# Patient Record
Sex: FEMALE | Race: WHITE | NOT HISPANIC OR LATINO | Employment: OTHER
[De-identification: names, ages, dates, MRNs, and addresses within clinical notes are randomized per-mention and may not be internally consistent; named-entity substitution may affect disease eponyms.]

---

## 2018-01-20 ENCOUNTER — HEALTH MAINTENANCE LETTER (OUTPATIENT)
Age: 28
End: 2018-01-20

## 2018-02-07 ENCOUNTER — OFFICE VISIT (OUTPATIENT)
Dept: OTOLARYNGOLOGY | Facility: CLINIC | Age: 28
End: 2018-02-07
Payer: COMMERCIAL

## 2018-02-07 VITALS
SYSTOLIC BLOOD PRESSURE: 116 MMHG | WEIGHT: 181 LBS | HEART RATE: 69 BPM | BODY MASS INDEX: 31.56 KG/M2 | DIASTOLIC BLOOD PRESSURE: 80 MMHG | TEMPERATURE: 98.2 F

## 2018-02-07 DIAGNOSIS — H61.23 BILATERAL IMPACTED CERUMEN: Primary | ICD-10-CM

## 2018-02-07 PROCEDURE — 69210 REMOVE IMPACTED EAR WAX UNI: CPT | Performed by: OTOLARYNGOLOGY

## 2018-02-07 PROCEDURE — 99207 ZZC DROP WITH A PROCEDURE: CPT | Performed by: OTOLARYNGOLOGY

## 2018-02-07 ASSESSMENT — PAIN SCALES - GENERAL: PAINLEVEL: NO PAIN (0)

## 2018-02-07 NOTE — MR AVS SNAPSHOT
After Visit Summary   2/7/2018    Ashley Lorenzo    MRN: [id number]           Patient Information     Date Of Birth          1990        Visit Information        Provider Department      2/7/2018 12:45 PM Skye Cosby MD [facility]        Today's Diagnoses     Bilateral impacted cerumen    -  1       Follow-ups after your visit        Who to contact     If you have questions or need follow up information about today's clinic visit or your schedule please contact [facility] directly at [phone].  Normal or non-critical lab and imaging results will be communicated to you by [employer], letter or phone within 4 business days after the clinic has received the results. If you do not hear from us within 7 days, please contact the clinic through [employer] or phone. If you have a critical or abnormal lab result, we will notify you by phone as soon as possible.  Submit refill requests through [employer] or call your pharmacy and they will forward the refill request to us. Please allow 3 business days for your refill to be completed.          Additional Information About Your Visit        MyChart Information     [employer] gives you secure access to your electronic health record. If you see a primary care provider, you can also send messages to your care team and make appointments. If you have questions, please call your primary care clinic.  If you do not have a primary care provider, please call [phone] and they will assist you.        Care EveryWhere ID     This is your Care EveryWhere ID. This could be used by other organizations to access your [place] medical records  [id number]        Your Vitals Were     Pulse Temperature BMI (Body Mass Index)             69 98.2  F (36.8  C) (Oral) 31.56 kg/m2          Blood Pressure from Last 3 Encounters:   02/07/18 116/80   06/24/15 121/70   06/17/15 120/68    Weight from Last 3 Encounters:   02/07/18 82.1 kg (181 lb)   06/17/15 84.8  kg (187 lb)   06/09/15 84.4 kg (186 lb)              We Performed the Following     [facility]        Primary Care Provider Office Phone # Fax #    Lynn Coulter [phone] [address] [place] [address]        Equal Access to Services     [facility] VAUGHN : Hadii aad ku hadasho Soomaali, waaxda luqadaha, qaybta kaalmada adeegyada, waxay idiin hayaan adeeg khbernardosh laivisn ah. So [facility] [phone].    ATENCIÓN: Si habla español, tiene a ayala disposición servicios gratuitos de asistencia lingüística. Ct al [phone].    We comply with applicable federal civil rights laws and [place] laws. We do not discriminate on the basis of race, color, national origin, age, disability, sex, sexual orientation, or gender identity.            Thank you!     Thank you for choosing [facility]  for your care. Our goal is always to provide you with excellent care. Hearing back from our patients is one way we can continue to improve our services. Please take a few minutes to complete the written survey that you may receive in the mail after your visit with us. Thank you!             Your Updated Medication List - Protect others around you: Learn how to safely use, store and throw away your medicines at www.disposemymeds.org.          This list is accurate as of 18 11:59 PM.  Always use your most recent med list.                   Brand Name Dispense Instructions for use Diagnosis    ibuprofen 600 MG tablet    ADVIL/MOTRIN    90 tablet    Take 1 tablet (600 mg) by mouth every 6 hours as needed for moderate pain     (spontaneous vaginal delivery)

## 2018-02-07 NOTE — NURSING NOTE
"Initial /80 (BP Location: Right arm, Patient Position: Chair, Cuff Size: Adult Regular)  Pulse 69  Temp 98.2  F (36.8  C) (Oral)  Wt 82.1 kg (181 lb)  BMI 31.56 kg/m2 Estimated body mass index is 31.56 kg/(m^2) as calculated from the following:    Height as of 6/17/15: 1.613 m (5' 3.5\").    Weight as of this encounter: 82.1 kg (181 lb). .    Kristina Wesley LPN    "

## 2018-02-07 NOTE — LETTER
2018         RE: Ashley Lorenzo  [address] [facility] [address]        Dear Colleague,    Thank you for referring your patient, Ashley Lorenzo, to the [facility]. Please see a copy of my visit note below.        History of Present Illness - Ashley Lorenzo is a 27 year old female who presents with a 2 week history of left ear fullness and itching. She has tried using qtips to clear it out. It feels like there is water trapped in it. She denies prior ear surgery. She feels her hearing is a bit diminished currently bilaterally.    Past Medical History -   Patient Active Problem List   Diagnosis     Seasonal allergic rhinitis     CARDIOVASCULAR SCREENING; LDL GOAL LESS THAN 160     Mild major depression (H)     Generalized anxiety disorder     Migraine     Environmental allergies     Encounter for supervision of other normal pregnancy     Labial minora hypertrophy     GBS (group B Streptococcus carrier), +RV culture, currently pregnant     SROM (spontaneous rupture of membranes)      (spontaneous vaginal delivery)       Current Medications -   Current Outpatient Prescriptions:      ibuprofen (ADVIL,MOTRIN) 600 MG tablet, Take 1 tablet (600 mg) by mouth every 6 hours as needed for moderate pain, Disp: 90 tablet, Rfl: 1    Allergies -   Allergies   Allergen Reactions     Cats      Dogs      Dust Mites      Pollen Extract/Tree Extract        Social History -   Social History     Social History     Marital status:      Spouse name: Tacos     Number of children: 1     Years of education: N/A     Occupational History      Self Employed.      [employer]     Social History Main Topics     Smoking status: Never Smoker     Smokeless tobacco: Never Used     Alcohol use Yes      Comment: rare     Drug use: No     Sexual activity: Yes     Partners: Male      Comment:  since      Other Topics Concern     Parent/Sibling W/ Cabg, Mi Or Angioplasty Before 65f 55m? No     Social  History Narrative       Family History -   Family History   Problem Relation Age of Onset     CANCER Maternal Grandmother      skin     Blood Disease Maternal Grandmother      anemia     Thyroid Disease Maternal Grandmother      Cardiovascular Maternal Grandmother      MI- pacemaker     Alzheimer Disease Maternal Grandmother      DIABETES Father      Lipids Father      Hypertension Father      DIABETES Paternal Grandmother      DIABETES Paternal Grandfather      Hypertension Paternal Grandfather      Lipids Paternal Grandfather      CANCER Mother      anemia     Dementia Maternal Grandfather        Review of Systems - As per HPI and PMHx, otherwise 7 system review of the head and neck negative. 10+ system review negative.    Physical Exam  /80 (BP Location: Right arm, Patient Position: Chair, Cuff Size: Adult Regular)  Pulse 69  Temp 98.2  F (36.8  C) (Oral)  Wt 82.1 kg (181 lb)  BMI 31.56 kg/m2  General - The patient is well nourished and well developed, and appears to have good nutritional status.  Alert and oriented to person and place, answers questions and cooperates with examination appropriately.   Head and Face - Normocephalic and atraumatic, with no gross asymmetry noted of the contour of the facial features.  The facial nerve is intact, with strong symmetric movements.  Voice and Breathing - The patient was breathing comfortably without the use of accessory muscles. There was no wheezing, stridor, or stertor.  The patients voice was clear and strong, and had appropriate pitch and quality.  Ears - after cerumen removal, Bilateral pinna and EACs with normal appearing overlying skin. Tympanic membrane intact with good mobility on pneumatic otoscopy bilaterally. Bony landmarks of the ossicular chain are normal. The tympanic membranes are normal in appearance. No retraction, perforation, or masses.  No fluid or purulence was seen in the external canal or the middle ear.   Eyes - Extraocular movements  intact.  Sclera were not icteric or injected, conjunctiva were pink and moist.  Mouth - Examination of the oral cavity showed pink, healthy oral mucosa. No lesions or ulcerations noted.  The tongue was mobile and midline, and the dentition were in good condition.    Throat - The walls of the oropharynx were smooth, pink, moist, symmetric, and had no lesions or ulcerations.  The tonsillar pillars and soft palate were symmetric.  The uvula was midline on elevation.  Neck - Normal midline excursion of the laryngotracheal complex during swallowing.  Full range of motion on passive movement.  Palpation of the occipital, submental, submandibular, internal jugular chain, and supraclavicular nodes did not demonstrate any abnormal lymph nodes or masses.  The carotid pulse was palpable bilaterally.  Palpation of the thyroid was soft and smooth, with no nodules or goiter appreciated.  The trachea was mobile and midline.  Nose - External contour is symmetric, no gross deflection or scars.  Nasal mucosa is pink and moist with no abnormal mucus.  The septum was midline and non-obstructive, turbinates of normal size and position.  No polyps, masses, or purulence noted on examination.    Procedure: Cerumen Disimpaction  Diagnosis: Cerumen Impaction    Procedure and Findings  Ears - On examination of the ears, I found that the  ears were impacted with dense cerumen obscuring visualization of the left and right TM.  Therefore, I positioned the patient and I used the binocular surgical microscope to assist in visualization of the affected ear(s).  I began by using a cerumen loop to gently lift the edges of the cerumen mass away from the walls of the external canal.  Once I did this, I was able to suction away fragments of wax and debris using suction.  Once the mass was loose enough, the entire plug was pulled from the canal with microforceps.  The tympanic membrane was intact without sign of perforation or middle ear effusion and  minimal ear canal trauma.           Assessment - Ashley Lorenzo is a 27 year old female with bilateral cerumen impaction. I suspect this may be trapping moisture and resulting in her symptoms. I advised her to avoid q-tip use, as it can contribute to ear itching and cerumen impaction. Return if there is any change in hearing in the future. She currently feels her hearing is much improved.      Dr. Skye Cosby MD  Otolaryngology  [facility]        Again, thank you for allowing me to participate in the care of your patient.        Sincerely,        Skye Cosby MD

## 2018-02-08 NOTE — PROGRESS NOTES
History of Present Illness - Ashley Lorenzo is a 27 year old female who presents with a 2 week history of left ear fullness and itching. She has tried using qtips to clear it out. It feels like there is water trapped in it. She denies prior ear surgery. She feels her hearing is a bit diminished currently bilaterally.    Past Medical History -   Patient Active Problem List   Diagnosis     Seasonal allergic rhinitis     CARDIOVASCULAR SCREENING; LDL GOAL LESS THAN 160     Mild major depression (H)     Generalized anxiety disorder     Migraine     Environmental allergies     Encounter for supervision of other normal pregnancy     Labial minora hypertrophy     GBS (group B Streptococcus carrier), +RV culture, currently pregnant     SROM (spontaneous rupture of membranes)      (spontaneous vaginal delivery)       Current Medications -   Current Outpatient Prescriptions:      ibuprofen (ADVIL,MOTRIN) 600 MG tablet, Take 1 tablet (600 mg) by mouth every 6 hours as needed for moderate pain, Disp: 90 tablet, Rfl: 1    Allergies -   Allergies   Allergen Reactions     Cats      Dogs      Dust Mites      Pollen Extract/Tree Extract        Social History -   Social History     Social History     Marital status:      Spouse name: Tacos     Number of children: 1     Years of education: N/A     Occupational History      Self Employed.      [employer]     Social History Main Topics     Smoking status: Never Smoker     Smokeless tobacco: Never Used     Alcohol use Yes      Comment: rare     Drug use: No     Sexual activity: Yes     Partners: Male      Comment:  since      Other Topics Concern     Parent/Sibling W/ Cabg, Mi Or Angioplasty Before 65f 55m? No     Social History Narrative       Family History -   Family History   Problem Relation Age of Onset     CANCER Maternal Grandmother      skin     Blood Disease Maternal Grandmother      anemia     Thyroid Disease Maternal Grandmother      Cardiovascular  Maternal Grandmother      MI- pacemaker     Alzheimer Disease Maternal Grandmother      DIABETES Father      Lipids Father      Hypertension Father      DIABETES Paternal Grandmother      DIABETES Paternal Grandfather      Hypertension Paternal Grandfather      Lipids Paternal Grandfather      CANCER Mother      anemia     Dementia Maternal Grandfather        Review of Systems - As per HPI and PMHx, otherwise 7 system review of the head and neck negative. 10+ system review negative.    Physical Exam  /80 (BP Location: Right arm, Patient Position: Chair, Cuff Size: Adult Regular)  Pulse 69  Temp 98.2  F (36.8  C) (Oral)  Wt 82.1 kg (181 lb)  BMI 31.56 kg/m2  General - The patient is well nourished and well developed, and appears to have good nutritional status.  Alert and oriented to person and place, answers questions and cooperates with examination appropriately.   Head and Face - Normocephalic and atraumatic, with no gross asymmetry noted of the contour of the facial features.  The facial nerve is intact, with strong symmetric movements.  Voice and Breathing - The patient was breathing comfortably without the use of accessory muscles. There was no wheezing, stridor, or stertor.  The patients voice was clear and strong, and had appropriate pitch and quality.  Ears - after cerumen removal, Bilateral pinna and EACs with normal appearing overlying skin. Tympanic membrane intact with good mobility on pneumatic otoscopy bilaterally. Bony landmarks of the ossicular chain are normal. The tympanic membranes are normal in appearance. No retraction, perforation, or masses.  No fluid or purulence was seen in the external canal or the middle ear.   Eyes - Extraocular movements intact.  Sclera were not icteric or injected, conjunctiva were pink and moist.  Mouth - Examination of the oral cavity showed pink, healthy oral mucosa. No lesions or ulcerations noted.  The tongue was mobile and midline, and the dentition were  in good condition.    Throat - The walls of the oropharynx were smooth, pink, moist, symmetric, and had no lesions or ulcerations.  The tonsillar pillars and soft palate were symmetric.  The uvula was midline on elevation.  Neck - Normal midline excursion of the laryngotracheal complex during swallowing.  Full range of motion on passive movement.  Palpation of the occipital, submental, submandibular, internal jugular chain, and supraclavicular nodes did not demonstrate any abnormal lymph nodes or masses.  The carotid pulse was palpable bilaterally.  Palpation of the thyroid was soft and smooth, with no nodules or goiter appreciated.  The trachea was mobile and midline.  Nose - External contour is symmetric, no gross deflection or scars.  Nasal mucosa is pink and moist with no abnormal mucus.  The septum was midline and non-obstructive, turbinates of normal size and position.  No polyps, masses, or purulence noted on examination.    Procedure: Cerumen Disimpaction  Diagnosis: Cerumen Impaction    Procedure and Findings  Ears - On examination of the ears, I found that the  ears were impacted with dense cerumen obscuring visualization of the left and right TM.  Therefore, I positioned the patient and I used the binocular surgical microscope to assist in visualization of the affected ear(s).  I began by using a cerumen loop to gently lift the edges of the cerumen mass away from the walls of the external canal.  Once I did this, I was able to suction away fragments of wax and debris using suction.  Once the mass was loose enough, the entire plug was pulled from the canal with microforceps.  The tympanic membrane was intact without sign of perforation or middle ear effusion and minimal ear canal trauma.           Assessment - Ashley Lorenzo is a 27 year old female with bilateral cerumen impaction. I suspect this may be trapping moisture and resulting in her symptoms. I advised her to avoid q-tip use, as it can contribute  to ear itching and cerumen impaction. Return if there is any change in hearing in the future. She currently feels her hearing is much improved.      Dr. Skye Cosby MD  Otolaryngology  [facility]

## 2019-09-28 ENCOUNTER — HEALTH MAINTENANCE LETTER (OUTPATIENT)
Age: 29
End: 2019-09-28

## 2020-03-15 ENCOUNTER — HEALTH MAINTENANCE LETTER (OUTPATIENT)
Age: 30
End: 2020-03-15

## 2023-03-26 ENCOUNTER — HOSPITAL ENCOUNTER (INPATIENT)
Facility: CLINIC | Age: 33
LOS: 3 days | Discharge: HOME | End: 2023-03-29
Attending: EMERGENCY MEDICINE | Admitting: INTERNAL MEDICINE
Payer: COMMERCIAL

## 2023-03-26 ENCOUNTER — APPOINTMENT (OUTPATIENT)
Dept: ULTRASOUND IMAGING | Facility: CLINIC | Age: 33
End: 2023-03-26
Attending: EMERGENCY MEDICINE
Payer: COMMERCIAL

## 2023-03-26 DIAGNOSIS — R74.01 TRANSAMINITIS: ICD-10-CM

## 2023-03-26 DIAGNOSIS — K85.10 GALLSTONE PANCREATITIS: Primary | ICD-10-CM

## 2023-03-26 LAB
ALBUMIN SERPL BCG-MCNC: 4.2 G/DL (ref 3.5–5.2)
ALBUMIN UR-MCNC: NEGATIVE MG/DL
ALP SERPL-CCNC: 316 U/L (ref 35–104)
ALT SERPL W P-5'-P-CCNC: 737 U/L (ref 10–35)
ANION GAP SERPL CALCULATED.3IONS-SCNC: 12 MMOL/L (ref 7–15)
APAP SERPL-MCNC: <5 UG/ML (ref 10–30)
APPEARANCE UR: CLEAR
AST SERPL W P-5'-P-CCNC: 423 U/L (ref 10–35)
BASOPHILS # BLD AUTO: 0.1 10E3/UL (ref 0–0.2)
BASOPHILS NFR BLD AUTO: 1 %
BILIRUB SERPL-MCNC: 2.2 MG/DL
BILIRUB UR QL STRIP: ABNORMAL
BUN SERPL-MCNC: 9.3 MG/DL (ref 6–20)
CALCIUM SERPL-MCNC: 9.4 MG/DL (ref 8.6–10)
CHLORIDE SERPL-SCNC: 103 MMOL/L (ref 98–107)
COLOR UR AUTO: ABNORMAL
CREAT SERPL-MCNC: 0.73 MG/DL (ref 0.51–0.95)
DEPRECATED HCO3 PLAS-SCNC: 24 MMOL/L (ref 22–29)
EOSINOPHIL # BLD AUTO: 0.1 10E3/UL (ref 0–0.7)
EOSINOPHIL NFR BLD AUTO: 1 %
ERYTHROCYTE [DISTWIDTH] IN BLOOD BY AUTOMATED COUNT: 13.5 % (ref 10–15)
GFR SERPL CREATININE-BSD FRML MDRD: >90 ML/MIN/1.73M2
GLUCOSE SERPL-MCNC: 121 MG/DL (ref 70–99)
GLUCOSE UR STRIP-MCNC: NEGATIVE MG/DL
HCG UR QL: NEGATIVE
HCT VFR BLD AUTO: 38.4 % (ref 35–47)
HGB BLD-MCNC: 12.3 G/DL (ref 11.7–15.7)
HGB UR QL STRIP: NEGATIVE
HOLD SPECIMEN: NORMAL
HOLD SPECIMEN: NORMAL
IMM GRANULOCYTES # BLD: 0 10E3/UL
IMM GRANULOCYTES NFR BLD: 0 %
INR PPP: 0.9 (ref 0.85–1.15)
KETONES UR STRIP-MCNC: NEGATIVE MG/DL
LEUKOCYTE ESTERASE UR QL STRIP: NEGATIVE
LIPASE SERPL-CCNC: >3000 U/L (ref 13–60)
LYMPHOCYTES # BLD AUTO: 1.5 10E3/UL (ref 0.8–5.3)
LYMPHOCYTES NFR BLD AUTO: 19 %
MCH RBC QN AUTO: 29.1 PG (ref 26.5–33)
MCHC RBC AUTO-ENTMCNC: 32 G/DL (ref 31.5–36.5)
MCV RBC AUTO: 91 FL (ref 78–100)
MONOCYTES # BLD AUTO: 0.5 10E3/UL (ref 0–1.3)
MONOCYTES NFR BLD AUTO: 6 %
MUCOUS THREADS #/AREA URNS LPF: PRESENT /LPF
NEUTROPHILS # BLD AUTO: 6 10E3/UL (ref 1.6–8.3)
NEUTROPHILS NFR BLD AUTO: 73 %
NITRATE UR QL: NEGATIVE
NRBC # BLD AUTO: 0 10E3/UL
NRBC BLD AUTO-RTO: 0 /100
PH UR STRIP: 5.5 [PH] (ref 5–7)
PLATELET # BLD AUTO: 348 10E3/UL (ref 150–450)
POTASSIUM SERPL-SCNC: 3.7 MMOL/L (ref 3.4–5.3)
PROT SERPL-MCNC: 7.5 G/DL (ref 6.4–8.3)
RBC # BLD AUTO: 4.23 10E6/UL (ref 3.8–5.2)
RBC URINE: 1 /HPF
SODIUM SERPL-SCNC: 139 MMOL/L (ref 136–145)
SP GR UR STRIP: 1.02 (ref 1–1.03)
UROBILINOGEN UR STRIP-MCNC: 2 MG/DL
WBC # BLD AUTO: 8.2 10E3/UL (ref 4–11)
WBC URINE: 1 /HPF

## 2023-03-26 PROCEDURE — 96375 TX/PRO/DX INJ NEW DRUG ADDON: CPT

## 2023-03-26 PROCEDURE — 250N000011 HC RX IP 250 OP 636: Performed by: EMERGENCY MEDICINE

## 2023-03-26 PROCEDURE — 99285 EMERGENCY DEPT VISIT HI MDM: CPT | Mod: 25

## 2023-03-26 PROCEDURE — 258N000003 HC RX IP 258 OP 636: Performed by: EMERGENCY MEDICINE

## 2023-03-26 PROCEDURE — 258N000003 HC RX IP 258 OP 636

## 2023-03-26 PROCEDURE — 80143 DRUG ASSAY ACETAMINOPHEN: CPT | Performed by: EMERGENCY MEDICINE

## 2023-03-26 PROCEDURE — 81025 URINE PREGNANCY TEST: CPT | Performed by: EMERGENCY MEDICINE

## 2023-03-26 PROCEDURE — 80053 COMPREHEN METABOLIC PANEL: CPT | Performed by: EMERGENCY MEDICINE

## 2023-03-26 PROCEDURE — 83690 ASSAY OF LIPASE: CPT | Performed by: EMERGENCY MEDICINE

## 2023-03-26 PROCEDURE — 85610 PROTHROMBIN TIME: CPT | Performed by: EMERGENCY MEDICINE

## 2023-03-26 PROCEDURE — 81003 URINALYSIS AUTO W/O SCOPE: CPT | Performed by: EMERGENCY MEDICINE

## 2023-03-26 PROCEDURE — 250N000011 HC RX IP 250 OP 636

## 2023-03-26 PROCEDURE — 99222 1ST HOSP IP/OBS MODERATE 55: CPT

## 2023-03-26 PROCEDURE — 36415 COLL VENOUS BLD VENIPUNCTURE: CPT | Performed by: EMERGENCY MEDICINE

## 2023-03-26 PROCEDURE — 96365 THER/PROPH/DIAG IV INF INIT: CPT

## 2023-03-26 PROCEDURE — 120N000004 HC R&B MS OVERFLOW

## 2023-03-26 PROCEDURE — 85025 COMPLETE CBC W/AUTO DIFF WBC: CPT | Performed by: EMERGENCY MEDICINE

## 2023-03-26 PROCEDURE — 76705 ECHO EXAM OF ABDOMEN: CPT

## 2023-03-26 PROCEDURE — 93005 ELECTROCARDIOGRAM TRACING: CPT

## 2023-03-26 RX ORDER — IBUPROFEN 600 MG/1
600 TABLET, FILM COATED ORAL EVERY 6 HOURS PRN
Status: DISCONTINUED | OUTPATIENT
Start: 2023-03-27 | End: 2023-03-29 | Stop reason: HOSPADM

## 2023-03-26 RX ORDER — PROCHLORPERAZINE 25 MG
25 SUPPOSITORY, RECTAL RECTAL EVERY 12 HOURS PRN
Status: DISCONTINUED | OUTPATIENT
Start: 2023-03-26 | End: 2023-03-29 | Stop reason: HOSPADM

## 2023-03-26 RX ORDER — ACETAMINOPHEN 500 MG
500 TABLET ORAL DAILY PRN
Status: ON HOLD | COMMUNITY
End: 2023-03-29

## 2023-03-26 RX ORDER — NALOXONE HYDROCHLORIDE 0.4 MG/ML
0.4 INJECTION, SOLUTION INTRAMUSCULAR; INTRAVENOUS; SUBCUTANEOUS
Status: DISCONTINUED | OUTPATIENT
Start: 2023-03-26 | End: 2023-03-29 | Stop reason: HOSPADM

## 2023-03-26 RX ORDER — LIDOCAINE 40 MG/G
CREAM TOPICAL
Status: DISCONTINUED | OUTPATIENT
Start: 2023-03-26 | End: 2023-03-29 | Stop reason: HOSPADM

## 2023-03-26 RX ORDER — HYDROMORPHONE HCL IN WATER/PF 6 MG/30 ML
0.4 PATIENT CONTROLLED ANALGESIA SYRINGE INTRAVENOUS
Status: DISCONTINUED | OUTPATIENT
Start: 2023-03-26 | End: 2023-03-29 | Stop reason: HOSPADM

## 2023-03-26 RX ORDER — ONDANSETRON 2 MG/ML
4 INJECTION INTRAMUSCULAR; INTRAVENOUS ONCE
Status: COMPLETED | OUTPATIENT
Start: 2023-03-26 | End: 2023-03-26

## 2023-03-26 RX ORDER — PROCHLORPERAZINE MALEATE 10 MG
10 TABLET ORAL EVERY 6 HOURS PRN
Status: DISCONTINUED | OUTPATIENT
Start: 2023-03-26 | End: 2023-03-29 | Stop reason: HOSPADM

## 2023-03-26 RX ORDER — OXYCODONE HYDROCHLORIDE 5 MG/1
5 TABLET ORAL EVERY 4 HOURS PRN
Status: DISCONTINUED | OUTPATIENT
Start: 2023-03-26 | End: 2023-03-29 | Stop reason: HOSPADM

## 2023-03-26 RX ORDER — SODIUM CHLORIDE, SODIUM LACTATE, POTASSIUM CHLORIDE, CALCIUM CHLORIDE 600; 310; 30; 20 MG/100ML; MG/100ML; MG/100ML; MG/100ML
INJECTION, SOLUTION INTRAVENOUS CONTINUOUS
Status: DISCONTINUED | OUTPATIENT
Start: 2023-03-26 | End: 2023-03-29 | Stop reason: HOSPADM

## 2023-03-26 RX ORDER — AMOXICILLIN 250 MG
2 CAPSULE ORAL 2 TIMES DAILY PRN
Status: DISCONTINUED | OUTPATIENT
Start: 2023-03-26 | End: 2023-03-29 | Stop reason: HOSPADM

## 2023-03-26 RX ORDER — ONDANSETRON 4 MG/1
4 TABLET, ORALLY DISINTEGRATING ORAL EVERY 6 HOURS PRN
Status: DISCONTINUED | OUTPATIENT
Start: 2023-03-26 | End: 2023-03-29 | Stop reason: HOSPADM

## 2023-03-26 RX ORDER — NALOXONE HYDROCHLORIDE 0.4 MG/ML
0.2 INJECTION, SOLUTION INTRAMUSCULAR; INTRAVENOUS; SUBCUTANEOUS
Status: DISCONTINUED | OUTPATIENT
Start: 2023-03-26 | End: 2023-03-29 | Stop reason: HOSPADM

## 2023-03-26 RX ORDER — CEFTRIAXONE 2 G/1
2 INJECTION, POWDER, FOR SOLUTION INTRAMUSCULAR; INTRAVENOUS ONCE
Status: COMPLETED | OUTPATIENT
Start: 2023-03-26 | End: 2023-03-26

## 2023-03-26 RX ORDER — ONDANSETRON 2 MG/ML
4 INJECTION INTRAMUSCULAR; INTRAVENOUS EVERY 6 HOURS PRN
Status: DISCONTINUED | OUTPATIENT
Start: 2023-03-26 | End: 2023-03-29 | Stop reason: HOSPADM

## 2023-03-26 RX ORDER — CEFTRIAXONE 2 G/1
2 INJECTION, POWDER, FOR SOLUTION INTRAMUSCULAR; INTRAVENOUS EVERY 24 HOURS
Status: DISCONTINUED | OUTPATIENT
Start: 2023-03-27 | End: 2023-03-29 | Stop reason: HOSPADM

## 2023-03-26 RX ORDER — METRONIDAZOLE 500 MG/100ML
500 INJECTION, SOLUTION INTRAVENOUS ONCE
Status: COMPLETED | OUTPATIENT
Start: 2023-03-26 | End: 2023-03-26

## 2023-03-26 RX ORDER — HYDROMORPHONE HCL IN WATER/PF 6 MG/30 ML
0.2 PATIENT CONTROLLED ANALGESIA SYRINGE INTRAVENOUS
Status: DISCONTINUED | OUTPATIENT
Start: 2023-03-26 | End: 2023-03-29 | Stop reason: HOSPADM

## 2023-03-26 RX ORDER — BUSPIRONE HYDROCHLORIDE 10 MG/1
10 TABLET ORAL 3 TIMES DAILY PRN
COMMUNITY
Start: 2022-07-18

## 2023-03-26 RX ORDER — AMOXICILLIN 250 MG
1 CAPSULE ORAL 2 TIMES DAILY PRN
Status: DISCONTINUED | OUTPATIENT
Start: 2023-03-26 | End: 2023-03-29 | Stop reason: HOSPADM

## 2023-03-26 RX ORDER — FAMOTIDINE 20 MG/1
20 TABLET, FILM COATED ORAL 2 TIMES DAILY
Status: DISCONTINUED | OUTPATIENT
Start: 2023-03-27 | End: 2023-03-29 | Stop reason: HOSPADM

## 2023-03-26 RX ORDER — KETOROLAC TROMETHAMINE 15 MG/ML
15 INJECTION, SOLUTION INTRAMUSCULAR; INTRAVENOUS ONCE
Status: COMPLETED | OUTPATIENT
Start: 2023-03-26 | End: 2023-03-26

## 2023-03-26 RX ORDER — HYDROMORPHONE HYDROCHLORIDE 1 MG/ML
0.5 INJECTION, SOLUTION INTRAMUSCULAR; INTRAVENOUS; SUBCUTANEOUS ONCE
Status: COMPLETED | OUTPATIENT
Start: 2023-03-26 | End: 2023-03-26

## 2023-03-26 RX ORDER — METRONIDAZOLE 500 MG/100ML
500 INJECTION, SOLUTION INTRAVENOUS EVERY 8 HOURS
Status: DISCONTINUED | OUTPATIENT
Start: 2023-03-27 | End: 2023-03-29 | Stop reason: HOSPADM

## 2023-03-26 RX ADMIN — KETOROLAC TROMETHAMINE 15 MG: 15 INJECTION, SOLUTION INTRAMUSCULAR; INTRAVENOUS at 17:43

## 2023-03-26 RX ADMIN — SODIUM CHLORIDE, POTASSIUM CHLORIDE, SODIUM LACTATE AND CALCIUM CHLORIDE: 600; 310; 30; 20 INJECTION, SOLUTION INTRAVENOUS at 20:41

## 2023-03-26 RX ADMIN — HYDROMORPHONE HYDROCHLORIDE 0.5 MG: 1 INJECTION, SOLUTION INTRAMUSCULAR; INTRAVENOUS; SUBCUTANEOUS at 17:41

## 2023-03-26 RX ADMIN — METRONIDAZOLE 500 MG: 500 INJECTION, SOLUTION INTRAVENOUS at 19:31

## 2023-03-26 RX ADMIN — CEFTRIAXONE 2 G: 2 INJECTION, POWDER, FOR SOLUTION INTRAMUSCULAR; INTRAVENOUS at 17:53

## 2023-03-26 RX ADMIN — ONDANSETRON 4 MG: 2 INJECTION INTRAMUSCULAR; INTRAVENOUS at 17:43

## 2023-03-26 RX ADMIN — PROCHLORPERAZINE EDISYLATE 10 MG: 5 INJECTION INTRAMUSCULAR; INTRAVENOUS at 20:45

## 2023-03-26 RX ADMIN — FAMOTIDINE 20 MG: 10 INJECTION, SOLUTION INTRAVENOUS at 17:43

## 2023-03-26 RX ADMIN — SODIUM CHLORIDE 1000 ML: 9 INJECTION, SOLUTION INTRAVENOUS at 17:43

## 2023-03-26 ASSESSMENT — ENCOUNTER SYMPTOMS
ABDOMINAL PAIN: 1
VOMITING: 0
HEMATURIA: 0
SHORTNESS OF BREATH: 0
FREQUENCY: 0
CHILLS: 1
NAUSEA: 1
FEVER: 0
DYSURIA: 0
DIARRHEA: 0

## 2023-03-26 ASSESSMENT — ACTIVITIES OF DAILY LIVING (ADL)
VISION_MANAGEMENT: GLASSES
TOILETING_ISSUES: NO
CHANGE_IN_FUNCTIONAL_STATUS_SINCE_ONSET_OF_CURRENT_ILLNESS/INJURY: NO
ADLS_ACUITY_SCORE: 21
DOING_ERRANDS_INDEPENDENTLY_DIFFICULTY: NO
FALL_HISTORY_WITHIN_LAST_SIX_MONTHS: NO
WEAR_GLASSES_OR_BLIND: YES
DRESSING/BATHING_DIFFICULTY: NO
ADLS_ACUITY_SCORE: 33
ADLS_ACUITY_SCORE: 35
CONCENTRATING,_REMEMBERING_OR_MAKING_DECISIONS_DIFFICULTY: YES
WALKING_OR_CLIMBING_STAIRS_DIFFICULTY: NO
ADLS_ACUITY_SCORE: 21
DIFFICULTY_EATING/SWALLOWING: NO

## 2023-03-26 NOTE — ED TRIAGE NOTES
Pt complains of upper abd pain, mid back pain which has been on/off for past wks. Pt complains of intensified pain 4 hrs PTA. Pt was told liver enzymes were elevated and had gallstones. [facility] for blood work, [facility] for ultrasound. Pt complains of nausea.

## 2023-03-26 NOTE — PHARMACY-ADMISSION MEDICATION HISTORY
Admission medication history interview status for this patient is complete. See [facility] admission navigator for allergy information, prior to admission medications and immunization status.     Medication history interview done, indicate source(s): Patient  Medication history resources (including written lists, pill bottles, clinic record):[employer]  Pharmacy: -    Changes made to PTA medication list:  Added: Tylenol, asa-caffeine, Buspar  Changed: none  Reported as Not Taking: none  Removed: none    Actions taken by pharmacist (provider contacted, etc):None     Additional medication history information: Patient states she was prescribed Zofran recently but has not picked this up from the pharmacy yet- did not add to her medication list as she hasn't started this.     Medication reconciliation/reorder completed by provider prior to medication history?  N   (Y/N)       Prior to Admission medications    Medication Sig Last Dose Taking? Auth Provider Long Term End Date   acetaminophen (TYLENOL) 500 MG tablet Take 500 mg by mouth daily as needed for mild pain (migraines) 3/25/2023 at PM Yes Unknown, Entered By History No    busPIRone (BUSPAR) 10 MG tablet Take 10 mg by mouth 3 times daily as needed for anxiety More than a month at PRN Yes Unknown, Entered By History Yes    ibuprofen (ADVIL,MOTRIN) 600 MG tablet Take 1 tablet (600 mg) by mouth every 6 hours as needed for moderate pain Unknown at PRN Yes Madeleine Celaya, DO     UNABLE TO FIND MEDICATION NAME: Rehana back and body (aspirin and caffeine)- 1 tablet daily prn for migraines 3/25/2023 at PM Yes Unknown, Entered By History

## 2023-03-26 NOTE — ED NOTES
"Patient presenting to the  multiple times. Patient expressed that she is quite upset about the wait, requesting something be done for her pain while she waits, offered tylenol and ibuprofen which were declined by the patient. Educated the patient that narcotics are not available while she is in the lobby due to safety, patient verbalized \"I don't want that I just want something to be done!\" Patient upset and stating \"I guess I am just shit-out of luck if my gallbladder busts while I am sitting here.\"   Attempted to express to the patient that staff are doing everything possible for everyone in the lobby. Patient rolled her eyes at this and walked away.   "

## 2023-03-26 NOTE — ED NOTES
[facility]  ED Nurse Handoff Report    Ashley Lorenzo is a 32 year old female   ED Chief complaint: Abdominal Pain  . ED Diagnosis:   Final diagnoses:   Gallstone pancreatitis   Transaminitis     Allergies:   Allergies   Allergen Reactions     Cats      Dogs      Dust Mites      Penicillins      Pollen Extract/Tree Extract        Code Status: Full Code  Activity level - Baseline/Home:  Independent. Activity Level - Current:   Independent. Lift room needed: No. Bariatric: No   Needed: No   Isolation: No. Infection: Not Applicable.     Vital Signs:   Vitals:    03/26/23 1439 03/26/23 1730   BP: (!) 151/93 123/82   Pulse: 70 67   Resp: 17    Temp: 98  F (36.7  C)    TempSrc: Temporal    SpO2: 100% 100%       Cardiac Rhythm:  ,      Pain level:    Patient confused: No. Patient Falls Risk: Yes.   Elimination Status: Has voided   Patient Report - Initial Complaint: RUQ pain. Focused Assessment:     Genitourinary  Genitourinary  Genitourinary (Adult) Genitourinary WDL: WDL         1748  Gastrointestinal Gastrointestinal  Gastrointestinal Gastrointestinal WDL: .WDL except; all  GI Signs/Symptoms: vomiting; abdominal discomfort; nausea  (RUQ pain that radiates around to back. Intermittent x 3 weeks, worse starting today at 1200. Last ate/drank at 1300 today. Nausea and vomiting. Denies fever, diarrhea, blood in stool, problems with urination.)  Last Oral Intake Date: 03/26/23  Last Oral Intake Time: 1300  SH       1747  Neuro Cognitive  Neuro Cognitive  Neuro Cognitive (Adult) Cognitive/Neuro/Behavioral WDL: WDL   Jamari Coma Scale Best Eye Response: 4-->(E4) spontaneous  Best Motor Response: 6-->(M6) obeys commands  Best Verbal Response: 5-->(V5) oriented  [place] Coma Scale Score: 15         Tests Performed: labs, imaging. Abnormal Results:   Labs Ordered and Resulted from Time of ED Arrival to Time of ED Departure   COMPREHENSIVE METABOLIC PANEL - Abnormal       Result Value    Sodium 139       Potassium 3.7      Chloride 103      Carbon Dioxide (CO2) 24      Anion Gap 12      Urea Nitrogen 9.3      Creatinine 0.73      Calcium 9.4      Glucose 121 (*)     Alkaline Phosphatase 316 (*)      (*)      (*)     Protein Total 7.5      Albumin 4.2      Bilirubin Total 2.2 (*)     GFR Estimate >90     ROUTINE UA WITH MICROSCOPIC REFLEX TO CULTURE - Abnormal    Color Urine Dark Yellow (*)     Appearance Urine Clear      Glucose Urine Negative      Bilirubin Urine Small (*)     Ketones Urine Negative      Specific Gravity Urine 1.025      Blood Urine Negative      pH Urine 5.5      Protein Albumin Urine Negative      Urobilinogen Urine 2.0      Nitrite Urine Negative      Leukocyte Esterase Urine Negative      Mucus Urine Present (*)     RBC Urine 1      WBC Urine 1     LIPASE - Abnormal    Lipase >3,000 (*)    HCG QUALITATIVE URINE - Normal    hCG Urine Qualitative Negative     CBC WITH PLATELETS AND DIFFERENTIAL    WBC Count 8.2      RBC Count 4.23      Hemoglobin 12.3      Hematocrit 38.4      MCV 91      MCH 29.1      MCHC 32.0      RDW 13.5      Platelet Count 348      % Neutrophils 73      % Lymphocytes 19      % Monocytes 6      % Eosinophils 1      % Basophils 1      % Immature Granulocytes 0      NRBCs per 100 WBC 0      Absolute Neutrophils 6.0      Absolute Lymphocytes 1.5      Absolute Monocytes 0.5      Absolute Eosinophils 0.1      Absolute Basophils 0.1      Absolute Immature Granulocytes 0.0      Absolute NRBCs 0.0     ACETAMINOPHEN LEVEL   INR     US Abdomen Limited   Final Result   IMPRESSION:   1.  Cholelithiasis in an otherwise normal appearing gallbladder.   2.  Right upper quadrant ultrasound otherwise negative.              .   Treatments provided: see MAR  Family Comments: N/A  OBS brochure/video discussed/provided to patient:  Yes  ED Medications:   Medications   metroNIDAZOLE (FLAGYL) infusion 500 mg (has no administration in time range)   0.9% sodium chloride BOLUS (1,000 mLs  Intravenous $New Bag 3/26/23 1743)   cefTRIAXone (ROCEPHIN) 2 g vial to attach to  ml bag for ADULTS or NS 50 ml bag for PEDS (2 g Intravenous $New Bag 3/26/23 1753)   ondansetron (ZOFRAN) injection 4 mg (4 mg Intravenous $Given 3/26/23 1743)   famotidine (PEPCID) injection 20 mg (20 mg Intravenous $Given 3/26/23 1743)   HYDROmorphone (PF) (DILAUDID) injection 0.5 mg (0.5 mg Intravenous $Given 3/26/23 1741)   ketorolac (TORADOL) injection 15 mg (15 mg Intravenous $Given 3/26/23 1743)     Drips infusing:  No  For the majority of the shift, the patient's behavior Green. Interventions performed were N/A.    Sepsis treatment initiated: No     Patient tested for COVID 19 prior to admission: no    ED Nurse Name/Phone Number: Chi Duarte RN,   6:49 PM  RECEIVING UNIT ED HANDOFF REVIEW    Above ED Nurse Handoff Report was reviewed: Yes  Reviewed by: Chela Gutiérrez RN on March 26, 2023 at 7:58 PM

## 2023-03-26 NOTE — ED PROVIDER NOTES
History     Chief Complaint:  Abdominal Pain       HPI   Ashley Lorenzo is a 32 year old female who presents with abdominal pain. The patient reports a recent outpatient diagnosis of gallstones with elevated LFTs. She has been struggling with intermittent upper abdominal pain for several weeks. Yesterday night she started to having increased right upper quadrant abdominal pain with radiation to her back. Due to her ongoing pain she came into the ED today. She has been taking Tylenol and Aleve at home. She also reports some chills and nausea but no fever, shortness of breath, chest pain, vomiting, diarrhea, dysuria, hematuria, or urinary frequency. No alcohol use.      Independent Historian:   None - Patient Only    Review of External Notes:   Office visit 2/7/2018      ROS:  Review of Systems   Constitutional: Positive for chills. Negative for fever.   Respiratory: Negative for shortness of breath.    Cardiovascular: Negative for chest pain.   Gastrointestinal: Positive for abdominal pain and nausea. Negative for diarrhea and vomiting.   Genitourinary: Negative for dysuria, frequency and hematuria.   All other systems reviewed and are negative.      Allergies:  Penicillins    Medications:    Ibuprofen     Past Medical History:    Anxiety  Depression   Asthma  Migraine     Past Surgical History:    [place] teeth extraction  Statistic aminoinfusion      Family History:    Cancer - Mother   Diabetes - Father   Hypertension - Father  Lipids - Father     Social History:  The patient presents to the ED with a family member.   PCP: Lynn Almodovar     Physical Exam     Patient Vitals for the past 24 hrs:   BP Temp Temp src Pulse Resp SpO2   03/26/23 1746 125/79 -- -- 63 -- 100 %   03/26/23 1730 123/82 -- -- 67 -- 100 %   03/26/23 1439 (!) 151/93 98  F (36.7  C) Temporal 70 17 100 %        Physical Exam  Nursing note and vitals reviewed.  Constitutional: Well nourished. Appears uncomfortable   Eyes: Conjunctiva normal.   Pupils are equal, round, and reactive to light.   ENT: Nose normal. Mucous membranes pink and moist.    Neck: Normal range of motion.  CVS: Normal rate, regular rhythm.  Normal heart sounds.    Pulmonary: Lungs clear to auscultation bilaterally. No wheezes/rales/rhonchi.  GI: Abdomen soft. RUQ tenderness. No rigidity or guarding.  No CVA tenderness  MSK: No calf tenderness or swelling.  Neuro: Alert. Follows simple commands.  Skin: Skin is warm and dry. No rash noted.   Psychiatric: Normal affect.       Emergency Department Course   ECG  ECG taken at 18:57:35, ECG read at 1901  Sinus bradycardia, Otherwise normal ECG   Rate 58 bpm. ME interval 138 ms. QRS duration 82 ms. QT/QTc 438/429 ms. P-R-T axes 9 24 30.       Imaging:  US Abdomen Limited   Final Result   IMPRESSION:   1.  Cholelithiasis in an otherwise normal appearing gallbladder.   2.  Right upper quadrant ultrasound otherwise negative.            Report per radiology    Laboratory:  Labs Ordered and Resulted from Time of ED Arrival to Time of ED Departure   COMPREHENSIVE METABOLIC PANEL - Abnormal       Result Value    Sodium 139      Potassium 3.7      Chloride 103      Carbon Dioxide (CO2) 24      Anion Gap 12      Urea Nitrogen 9.3      Creatinine 0.73      Calcium 9.4      Glucose 121 (*)     Alkaline Phosphatase 316 (*)      (*)      (*)     Protein Total 7.5      Albumin 4.2      Bilirubin Total 2.2 (*)     GFR Estimate >90     ROUTINE UA WITH MICROSCOPIC REFLEX TO CULTURE - Abnormal    Color Urine Dark Yellow (*)     Appearance Urine Clear      Glucose Urine Negative      Bilirubin Urine Small (*)     Ketones Urine Negative      Specific Gravity Urine 1.025      Blood Urine Negative      pH Urine 5.5      Protein Albumin Urine Negative      Urobilinogen Urine 2.0      Nitrite Urine Negative      Leukocyte Esterase Urine Negative      Mucus Urine Present (*)     RBC Urine 1      WBC Urine 1     LIPASE - Abnormal    Lipase >3,000 (*)     ACETAMINOPHEN LEVEL - Abnormal    Acetaminophen <5.0 (*)    HCG QUALITATIVE URINE - Normal    hCG Urine Qualitative Negative     INR - Normal    INR 0.90     CBC WITH PLATELETS AND DIFFERENTIAL    WBC Count 8.2      RBC Count 4.23      Hemoglobin 12.3      Hematocrit 38.4      MCV 91      MCH 29.1      MCHC 32.0      RDW 13.5      Platelet Count 348      % Neutrophils 73      % Lymphocytes 19      % Monocytes 6      % Eosinophils 1      % Basophils 1      % Immature Granulocytes 0      NRBCs per 100 WBC 0      Absolute Neutrophils 6.0      Absolute Lymphocytes 1.5      Absolute Monocytes 0.5      Absolute Eosinophils 0.1      Absolute Basophils 0.1      Absolute Immature Granulocytes 0.0      Absolute NRBCs 0.0          Emergency Department Course & Assessments:     Interventions:  1741 Dilaudid 0.5 mg IV   1743 Toradol 15 mg IV   1743 NS 1,000 mL IV   1743 Zofran 4 mg IV   1743 Pepcid 20 mg IV   1753 Rocephin 2 g IV     Assessments:  1737: The patient was seen and evaluated.     Independent Interpretation (X-rays, CTs, rhythm strip):  EKG without STEMI    Consultations/Discussion of Management or Tests:  1845: I spoke with Hali GUTIERREZ for Dr. Che of the hospitalist service regarding patient's presentation, findings, and plan of care.         Social Determinants of Health affecting care:   none    Disposition:  The patient was admitted to the hospital under the care of Dr. Che.     Impression & Plan      Medical Decision Making:  Patient is a 32-year-old female presenting with predominantly complaints of abdominal pain.  She is quite uncomfortable appearing on arrival and unfortunately had a prolonged wait time secondary to large patient volumes.  Concern for gallstone pancreatitis based on work-up today.  She was empirically given a dose of IV antibiotics prior to her ultrasound returning to as to not delay care.  There is no evidence to suggest acute cholecystitis/ascending cholangitis.  Her pain was  controlled throughout her time in the ED.  She remained hemodynamically stable, accepted by hospitalist for admission.    Diagnosis:    ICD-10-CM    1. Gallstone pancreatitis  K85.10       2. Transaminitis  R74.01           Scribe Disclosure:  I, [place], am serving as a scribe at 5:37 PM on 3/26/2023 to document services personally performed by Naz Easton DO based on my observations and the provider's statements to me.   3/26/2023   Naz Easton DO McDonald, Lindsey E, DO  03/26/23 2116

## 2023-03-26 NOTE — H&P
[facility]    History and Physical - Hospitalist Service       Date of Admission:  3/26/2023    Assessment & Plan      Ashley Lorenzo is a 32 year old female admitted on 3/26 with PMH of headaches and cholelithiasis who developed sharp burning mid back pain that radiated through to the mid epigastric region.  Pain continued to worsen throughout the day.  1 nonbloody emesis.  Subjective fever and chills.  Had right upper quadrant pain on and off for the last several months but never this constant or severe.  Seen by her PCP at [facility] last week and diagnosed with cholelithiasis and transaminitis, however lipase was normal at that time.  Denies chest pain, shortness of breath, nausea.    CMP is notable for alk phosphatase 316, , , bili 2.2.  Lipase > 3000.  CBC unremarkable. Hcg negative. RUQ ultrasound shows cholelithiasis otherwise normal-appearing gallbladder, no biliary dilation.    Acute pancreatitis likely due to gallstones  Transaminitis  - Denies frequent alcohol use, suspect patient had a gallstone that passed into the common bile duct causing his transaminitis and acute pancreatitis. There is currently no CBD dilation on ultrasound.  - Do not suspect cholangitis but will cover as precaution given the report of chills, severe epigastric pain and elevated bilirubin   - IV ceftriaxone and metronidazole due to penicillin allergy  -NPO  - IVFs  ml/hr  -Pain regimen: Ibuprofen PRN, avoiding Tylenol due to elevated LFT, oxycodone and Dilaudid PRN  -Consult GI : Will defer to GI if they would prefer a MRCP vs ERCP tomorrow  -Consult general surgery for possible cholecystectomy  -Repeat CMP and CBC in AM    Cholelithiasis  -No signs of acute cholecystitis on ultrasound     Diet:  NPO  DVT Prophylaxis: Pneumatic Compression Devices, hold on lovenox due to possible procedures tomorrow or the following day  Anderson Catheter: Not present  Lines: None      Cardiac Monitoring: None  Code Status:   Full code       Disposition Plan 2-3 days pending improvement and further work-up of pancreatitis     The patient's care was discussed with the Patient and Patient's Family.    ELLIOTT Gregory PA-C  Hospitalist Service  [facility]  Securely message with [employer] (more info)  Text page via [facility] Paging/Directory     ______________________________________________________________________    Chief Complaint   Upper abdominal pain  Vomiting    History is obtained from the patient  And significant other    History of Present Illness   Ashley Lorenzo is a 32 year old female with PMH of headaches and cholelithiasis who developed sharp burning mid back pain that radiated through to the mid epigastric region.  Pain continues to worsen throughout the day.  1 nonbloody emesis.  Subjective fever and chills.  Had right upper quadrant pain on and off for the last several months but never this constant or severe.  Seen by her PCP at [facility] last week and diagnosed with cholelithiasis and transaminitis, however lipase at that time was normal.  Denies chest pain, shortness of breath, nausea.    In the ED, afebrile, blood pressure 151/93, heart rate 70, respirations 17, oxygen 100% on room air.  CMP is notable for alk phosphatase 316, , , bili 2.2.  Lipase > 3000.  CBC unremarkable. Hcg negative. RUQ ultrasound shows cholelithiasis otherwise normal-appearing gallbladder, no biliary dilation.    Past Medical History    Past Medical History:   Diagnosis Date     Anxiety     no meds needed     Chickenpox      Mild postpartum depression 2011       Past Surgical History   Past Surgical History:   Procedure Laterality Date     H STATISTIC AMNIOINFUSION  1/1/2012          MOUTH SURGERY      wisdom teeth       Prior to Admission Medications   Prior to Admission Medications   Prescriptions Last Dose Informant Patient Reported? Taking?    ibuprofen (ADVIL,MOTRIN) 600 MG tablet   No No   Sig: Take 1 tablet (600 mg) by mouth every 6 hours as needed for moderate pain      Facility-Administered Medications: None        Social History   I have reviewed this patient's social history and updated it with pertinent information if needed.  Social History     Tobacco Use     Smoking status: Never     Smokeless tobacco: Never   Substance Use Topics     Alcohol use: Yes     Comment: rare     Drug use: No     Allergies   Allergies   Allergen Reactions     Cats      Dogs      Dust Mites      Penicillins      Pollen Extract/Tree Extract         Physical Exam   Vital Signs: Temp: 98  F (36.7  C) Temp src: Temporal BP: 123/82 Pulse: 67   Resp: 17 SpO2: 100 % O2 Device: None (Room air)    Weight: 0 lbs 0 oz    GENERAL:  Alert, Comfortable, No acute distress. Laying in bed.   PSYCH: pleasant, oriented.  HEENT:  Normocephalic. No scleral icterus or conjunctival injection, normal hearing  HEART:  Normal S1, S2 with no murmur, RRR  LUNGS:  Normal Respiratory effort. Clear to auscultation bilaterally with no wheezing, rales or ronchi.  ABDOMEN:  Soft, tenderness in mid epigastric area, non distended. No peritoneal signs.   EXTREMITIES:  No pedal edema,  No cyanosis.   SKIN:  Warm, dry to touch.   NEUROLOGIC: Speech clear, alert & orientated x 4, no focal deficits.     Medical Decision Making       MANAGEMENT DISCUSSED with the following over the past 24 hours: ED provider, patient, significant other   NOTE(S)/MEDICAL RECORDS REVIEWED over the past 24 hours: Labs, imaging, progress notes      Data     I have personally reviewed the following data over the past 24 hrs:    8.2  \   12.3   / 348     139 103 9.3 /  121 (H)   3.7 24 0.73 \       ALT: 737 (HH) AST: 423 (H) AP: 316 (H) TBILI: 2.2 (H)   ALB: 4.2 TOT PROTEIN: 7.5 LIPASE: >3,000 (H)       INR:  0.90 PTT:  N/A   D-dimer:  N/A Fibrinogen:  N/A       Imaging results reviewed over the past 24 hrs:   Recent Results (from  the past 24 hour(s))   US Abdomen Limited    Narrative    EXAM: US ABDOMEN LIMITED  LOCATION: [facility]  DATE/TIME: 3/26/2023 6:11 PM    INDICATION: Right upper quadrant abdominal pain.  COMPARISON: None.  TECHNIQUE: Limited abdominal ultrasound.    FINDINGS:    GALLBLADDER: Gallstones in an otherwise normal gallbladder. No wall thickening, or pericholecystic fluid. Negative sonographic Gagnon's sign.    BILE DUCTS: No biliary dilatation. The common duct measures 3 mm.    LIVER: Normal parenchyma with smooth contour. No focal mass.    RIGHT KIDNEY: No hydronephrosis.    PANCREAS: Head and body portions normal, tail obscured by bowel gas.    No ascites.      Impression    IMPRESSION:  1.  Cholelithiasis in an otherwise normal appearing gallbladder.  2.  Right upper quadrant ultrasound otherwise negative.

## 2023-03-27 ENCOUNTER — ANESTHESIA (OUTPATIENT)
Dept: SURGERY | Facility: CLINIC | Age: 33
End: 2023-03-27
Payer: COMMERCIAL

## 2023-03-27 ENCOUNTER — ANESTHESIA EVENT (OUTPATIENT)
Dept: SURGERY | Facility: CLINIC | Age: 33
End: 2023-03-27
Payer: COMMERCIAL

## 2023-03-27 ENCOUNTER — APPOINTMENT (OUTPATIENT)
Dept: MRI IMAGING | Facility: CLINIC | Age: 33
End: 2023-03-27
Attending: PHYSICIAN ASSISTANT
Payer: COMMERCIAL

## 2023-03-27 ENCOUNTER — APPOINTMENT (OUTPATIENT)
Dept: GENERAL RADIOLOGY | Facility: CLINIC | Age: 33
End: 2023-03-27
Attending: INTERNAL MEDICINE
Payer: COMMERCIAL

## 2023-03-27 LAB
ALBUMIN SERPL BCG-MCNC: 3.4 G/DL (ref 3.5–5.2)
ALP SERPL-CCNC: 285 U/L (ref 35–104)
ALT SERPL W P-5'-P-CCNC: 688 U/L (ref 10–35)
ANION GAP SERPL CALCULATED.3IONS-SCNC: 7 MMOL/L (ref 7–15)
AST SERPL W P-5'-P-CCNC: 451 U/L (ref 10–35)
ATRIAL RATE - MUSE: 58 BPM
BILIRUB SERPL-MCNC: 2.1 MG/DL
BUN SERPL-MCNC: 8.4 MG/DL (ref 6–20)
CALCIUM SERPL-MCNC: 8.6 MG/DL (ref 8.6–10)
CHLORIDE SERPL-SCNC: 108 MMOL/L (ref 98–107)
CREAT SERPL-MCNC: 0.7 MG/DL (ref 0.51–0.95)
DEPRECATED HCO3 PLAS-SCNC: 24 MMOL/L (ref 22–29)
DIASTOLIC BLOOD PRESSURE - MUSE: NORMAL MMHG
ERCP: NORMAL
ERYTHROCYTE [DISTWIDTH] IN BLOOD BY AUTOMATED COUNT: 13.6 % (ref 10–15)
GFR SERPL CREATININE-BSD FRML MDRD: >90 ML/MIN/1.73M2
GLUCOSE SERPL-MCNC: 112 MG/DL (ref 70–99)
HCT VFR BLD AUTO: 33.6 % (ref 35–47)
HGB BLD-MCNC: 11 G/DL (ref 11.7–15.7)
INTERPRETATION ECG - MUSE: NORMAL
MCH RBC QN AUTO: 29.5 PG (ref 26.5–33)
MCHC RBC AUTO-ENTMCNC: 32.7 G/DL (ref 31.5–36.5)
MCV RBC AUTO: 90 FL (ref 78–100)
P AXIS - MUSE: 9 DEGREES
PLATELET # BLD AUTO: 263 10E3/UL (ref 150–450)
POTASSIUM SERPL-SCNC: 3.7 MMOL/L (ref 3.4–5.3)
PR INTERVAL - MUSE: 138 MS
PROT SERPL-MCNC: 5.8 G/DL (ref 6.4–8.3)
QRS DURATION - MUSE: 82 MS
QT - MUSE: 438 MS
QTC - MUSE: 429 MS
R AXIS - MUSE: 24 DEGREES
RBC # BLD AUTO: 3.73 10E6/UL (ref 3.8–5.2)
SODIUM SERPL-SCNC: 139 MMOL/L (ref 136–145)
SYSTOLIC BLOOD PRESSURE - MUSE: NORMAL MMHG
T AXIS - MUSE: 30 DEGREES
VENTRICULAR RATE- MUSE: 58 BPM
WBC # BLD AUTO: 5.8 10E3/UL (ref 4–11)

## 2023-03-27 PROCEDURE — 710N000009 HC RECOVERY PHASE 1, LEVEL 1, PER MIN: Performed by: INTERNAL MEDICINE

## 2023-03-27 PROCEDURE — C1769 GUIDE WIRE: HCPCS | Performed by: INTERNAL MEDICINE

## 2023-03-27 PROCEDURE — 99222 1ST HOSP IP/OBS MODERATE 55: CPT | Performed by: SURGERY

## 2023-03-27 PROCEDURE — 80053 COMPREHEN METABOLIC PANEL: CPT

## 2023-03-27 PROCEDURE — 272N000001 HC OR GENERAL SUPPLY STERILE: Performed by: INTERNAL MEDICINE

## 2023-03-27 PROCEDURE — 74183 MRI ABD W/O CNTR FLWD CNTR: CPT

## 2023-03-27 PROCEDURE — C1726 CATH, BAL DIL, NON-VASCULAR: HCPCS | Performed by: INTERNAL MEDICINE

## 2023-03-27 PROCEDURE — 120N000001 HC R&B MED SURG/OB

## 2023-03-27 PROCEDURE — 36415 COLL VENOUS BLD VENIPUNCTURE: CPT

## 2023-03-27 PROCEDURE — 999N000141 HC STATISTIC PRE-PROCEDURE NURSING ASSESSMENT: Performed by: INTERNAL MEDICINE

## 2023-03-27 PROCEDURE — 99232 SBSQ HOSP IP/OBS MODERATE 35: CPT | Performed by: INTERNAL MEDICINE

## 2023-03-27 PROCEDURE — 250N000011 HC RX IP 250 OP 636: Performed by: ANESTHESIOLOGY

## 2023-03-27 PROCEDURE — 0FC98ZZ EXTIRPATION OF MATTER FROM COMMON BILE DUCT, VIA NATURAL OR ARTIFICIAL OPENING ENDOSCOPIC: ICD-10-PCS | Performed by: INTERNAL MEDICINE

## 2023-03-27 PROCEDURE — 250N000009 HC RX 250: Performed by: INTERNAL MEDICINE

## 2023-03-27 PROCEDURE — 999N000179 XR SURGERY CARM FLUORO LESS THAN 5 MIN W STILLS: Mod: TC

## 2023-03-27 PROCEDURE — A9585 GADOBUTROL INJECTION: HCPCS | Performed by: INTERNAL MEDICINE

## 2023-03-27 PROCEDURE — 255N000002 HC RX 255 OP 636: Performed by: INTERNAL MEDICINE

## 2023-03-27 PROCEDURE — 85027 COMPLETE CBC AUTOMATED: CPT

## 2023-03-27 PROCEDURE — 258N000003 HC RX IP 258 OP 636: Performed by: ANESTHESIOLOGY

## 2023-03-27 PROCEDURE — 370N000017 HC ANESTHESIA TECHNICAL FEE, PER MIN: Performed by: INTERNAL MEDICINE

## 2023-03-27 PROCEDURE — 250N000025 HC SEVOFLURANE, PER MIN: Performed by: INTERNAL MEDICINE

## 2023-03-27 PROCEDURE — 360N000082 HC SURGERY LEVEL 2 W/ FLUORO, PER MIN: Performed by: INTERNAL MEDICINE

## 2023-03-27 PROCEDURE — 250N000011 HC RX IP 250 OP 636

## 2023-03-27 PROCEDURE — 258N000003 HC RX IP 258 OP 636

## 2023-03-27 PROCEDURE — 250N000009 HC RX 250

## 2023-03-27 PROCEDURE — 250N000013 HC RX MED GY IP 250 OP 250 PS 637

## 2023-03-27 RX ORDER — FLUMAZENIL 0.1 MG/ML
0.2 INJECTION, SOLUTION INTRAVENOUS
Status: ACTIVE | OUTPATIENT
Start: 2023-03-27 | End: 2023-03-28

## 2023-03-27 RX ORDER — LABETALOL HYDROCHLORIDE 5 MG/ML
10 INJECTION, SOLUTION INTRAVENOUS
Status: DISCONTINUED | OUTPATIENT
Start: 2023-03-27 | End: 2023-03-27 | Stop reason: HOSPADM

## 2023-03-27 RX ORDER — ONDANSETRON 2 MG/ML
4 INJECTION INTRAMUSCULAR; INTRAVENOUS EVERY 30 MIN PRN
Status: DISCONTINUED | OUTPATIENT
Start: 2023-03-27 | End: 2023-03-27 | Stop reason: HOSPADM

## 2023-03-27 RX ORDER — HYDROMORPHONE HCL IN WATER/PF 6 MG/30 ML
0.4 PATIENT CONTROLLED ANALGESIA SYRINGE INTRAVENOUS EVERY 5 MIN PRN
Status: DISCONTINUED | OUTPATIENT
Start: 2023-03-27 | End: 2023-03-27 | Stop reason: HOSPADM

## 2023-03-27 RX ORDER — SODIUM CHLORIDE, SODIUM LACTATE, POTASSIUM CHLORIDE, CALCIUM CHLORIDE 600; 310; 30; 20 MG/100ML; MG/100ML; MG/100ML; MG/100ML
INJECTION, SOLUTION INTRAVENOUS CONTINUOUS
Status: DISCONTINUED | OUTPATIENT
Start: 2023-03-27 | End: 2023-03-27 | Stop reason: HOSPADM

## 2023-03-27 RX ORDER — PROPOFOL 10 MG/ML
INJECTION, EMULSION INTRAVENOUS PRN
Status: DISCONTINUED | OUTPATIENT
Start: 2023-03-27 | End: 2023-03-27

## 2023-03-27 RX ORDER — LIDOCAINE 40 MG/G
CREAM TOPICAL
Status: DISCONTINUED | OUTPATIENT
Start: 2023-03-27 | End: 2023-03-27 | Stop reason: HOSPADM

## 2023-03-27 RX ORDER — ONDANSETRON 4 MG/1
4 TABLET, ORALLY DISINTEGRATING ORAL EVERY 6 HOURS PRN
Status: DISCONTINUED | OUTPATIENT
Start: 2023-03-27 | End: 2023-03-27

## 2023-03-27 RX ORDER — HYDRALAZINE HYDROCHLORIDE 20 MG/ML
10 INJECTION INTRAMUSCULAR; INTRAVENOUS ONCE
Status: COMPLETED | OUTPATIENT
Start: 2023-03-27 | End: 2023-03-27

## 2023-03-27 RX ORDER — LIDOCAINE HYDROCHLORIDE 20 MG/ML
INJECTION, SOLUTION INFILTRATION; PERINEURAL PRN
Status: DISCONTINUED | OUTPATIENT
Start: 2023-03-27 | End: 2023-03-27

## 2023-03-27 RX ORDER — FENTANYL CITRATE 50 UG/ML
25 INJECTION, SOLUTION INTRAMUSCULAR; INTRAVENOUS EVERY 5 MIN PRN
Status: DISCONTINUED | OUTPATIENT
Start: 2023-03-27 | End: 2023-03-27 | Stop reason: HOSPADM

## 2023-03-27 RX ORDER — INDOMETHACIN 50 MG/1
SUPPOSITORY RECTAL PRN
Status: DISCONTINUED | OUTPATIENT
Start: 2023-03-27 | End: 2023-03-27 | Stop reason: HOSPADM

## 2023-03-27 RX ORDER — GADOBUTROL 604.72 MG/ML
9 INJECTION INTRAVENOUS ONCE
Status: COMPLETED | OUTPATIENT
Start: 2023-03-27 | End: 2023-03-27

## 2023-03-27 RX ORDER — ONDANSETRON 2 MG/ML
INJECTION INTRAMUSCULAR; INTRAVENOUS PRN
Status: DISCONTINUED | OUTPATIENT
Start: 2023-03-27 | End: 2023-03-27

## 2023-03-27 RX ORDER — HYDROMORPHONE HCL IN WATER/PF 6 MG/30 ML
0.2 PATIENT CONTROLLED ANALGESIA SYRINGE INTRAVENOUS EVERY 5 MIN PRN
Status: DISCONTINUED | OUTPATIENT
Start: 2023-03-27 | End: 2023-03-27 | Stop reason: HOSPADM

## 2023-03-27 RX ORDER — INDOMETHACIN 50 MG/1
100 SUPPOSITORY RECTAL
Status: DISCONTINUED | OUTPATIENT
Start: 2023-03-27 | End: 2023-03-27 | Stop reason: HOSPADM

## 2023-03-27 RX ORDER — FENTANYL CITRATE 50 UG/ML
INJECTION, SOLUTION INTRAMUSCULAR; INTRAVENOUS PRN
Status: DISCONTINUED | OUTPATIENT
Start: 2023-03-27 | End: 2023-03-27

## 2023-03-27 RX ORDER — GLYCOPYRROLATE 0.2 MG/ML
INJECTION, SOLUTION INTRAMUSCULAR; INTRAVENOUS PRN
Status: DISCONTINUED | OUTPATIENT
Start: 2023-03-27 | End: 2023-03-27

## 2023-03-27 RX ORDER — DEXAMETHASONE SODIUM PHOSPHATE 4 MG/ML
INJECTION, SOLUTION INTRA-ARTICULAR; INTRALESIONAL; INTRAMUSCULAR; INTRAVENOUS; SOFT TISSUE PRN
Status: DISCONTINUED | OUTPATIENT
Start: 2023-03-27 | End: 2023-03-27

## 2023-03-27 RX ORDER — FENTANYL CITRATE 50 UG/ML
50 INJECTION, SOLUTION INTRAMUSCULAR; INTRAVENOUS EVERY 5 MIN PRN
Status: DISCONTINUED | OUTPATIENT
Start: 2023-03-27 | End: 2023-03-27 | Stop reason: HOSPADM

## 2023-03-27 RX ORDER — ONDANSETRON 2 MG/ML
4 INJECTION INTRAMUSCULAR; INTRAVENOUS EVERY 6 HOURS PRN
Status: DISCONTINUED | OUTPATIENT
Start: 2023-03-27 | End: 2023-03-27

## 2023-03-27 RX ORDER — ONDANSETRON 4 MG/1
4 TABLET, ORALLY DISINTEGRATING ORAL EVERY 30 MIN PRN
Status: DISCONTINUED | OUTPATIENT
Start: 2023-03-27 | End: 2023-03-27 | Stop reason: HOSPADM

## 2023-03-27 RX ADMIN — ONDANSETRON 4 MG: 2 INJECTION INTRAMUSCULAR; INTRAVENOUS at 00:14

## 2023-03-27 RX ADMIN — OXYCODONE HYDROCHLORIDE 2.5 MG: 5 TABLET ORAL at 11:51

## 2023-03-27 RX ADMIN — DEXAMETHASONE SODIUM PHOSPHATE 4 MG: 4 INJECTION, SOLUTION INTRA-ARTICULAR; INTRALESIONAL; INTRAMUSCULAR; INTRAVENOUS; SOFT TISSUE at 16:31

## 2023-03-27 RX ADMIN — PROPOFOL 200 MG: 10 INJECTION, EMULSION INTRAVENOUS at 16:23

## 2023-03-27 RX ADMIN — LIDOCAINE HYDROCHLORIDE 50 MG: 20 INJECTION, SOLUTION INFILTRATION; PERINEURAL at 16:23

## 2023-03-27 RX ADMIN — FAMOTIDINE 20 MG: 20 TABLET, FILM COATED ORAL at 08:10

## 2023-03-27 RX ADMIN — HYDRALAZINE HYDROCHLORIDE 10 MG: 20 INJECTION INTRAMUSCULAR; INTRAVENOUS at 17:31

## 2023-03-27 RX ADMIN — SODIUM CHLORIDE, POTASSIUM CHLORIDE, SODIUM LACTATE AND CALCIUM CHLORIDE: 600; 310; 30; 20 INJECTION, SOLUTION INTRAVENOUS at 05:05

## 2023-03-27 RX ADMIN — IBUPROFEN 600 MG: 600 TABLET, FILM COATED ORAL at 08:10

## 2023-03-27 RX ADMIN — GADOBUTROL 9 ML: 604.72 INJECTION INTRAVENOUS at 11:01

## 2023-03-27 RX ADMIN — FAMOTIDINE 20 MG: 20 TABLET, FILM COATED ORAL at 19:47

## 2023-03-27 RX ADMIN — FENTANYL CITRATE 100 MCG: 50 INJECTION, SOLUTION INTRAMUSCULAR; INTRAVENOUS at 16:23

## 2023-03-27 RX ADMIN — MIDAZOLAM 2 MG: 1 INJECTION INTRAMUSCULAR; INTRAVENOUS at 16:17

## 2023-03-27 RX ADMIN — METRONIDAZOLE 500 MG: 500 INJECTION, SOLUTION INTRAVENOUS at 03:57

## 2023-03-27 RX ADMIN — Medication 100 MG: at 16:23

## 2023-03-27 RX ADMIN — METRONIDAZOLE 500 MG: 500 INJECTION, SOLUTION INTRAVENOUS at 11:52

## 2023-03-27 RX ADMIN — METRONIDAZOLE 500 MG: 500 INJECTION, SOLUTION INTRAVENOUS at 20:41

## 2023-03-27 RX ADMIN — SODIUM CHLORIDE, POTASSIUM CHLORIDE, SODIUM LACTATE AND CALCIUM CHLORIDE: 600; 310; 30; 20 INJECTION, SOLUTION INTRAVENOUS at 16:17

## 2023-03-27 RX ADMIN — IBUPROFEN 600 MG: 600 TABLET, FILM COATED ORAL at 14:28

## 2023-03-27 RX ADMIN — ONDANSETRON 4 MG: 2 INJECTION INTRAMUSCULAR; INTRAVENOUS at 16:31

## 2023-03-27 RX ADMIN — GLYCOPYRROLATE 0.2 MG: 0.2 INJECTION, SOLUTION INTRAMUSCULAR; INTRAVENOUS at 16:31

## 2023-03-27 ASSESSMENT — ACTIVITIES OF DAILY LIVING (ADL)
ADLS_ACUITY_SCORE: 21

## 2023-03-27 NOTE — PLAN OF CARE
/59   Pulse 66   Temp 98.2  F (36.8  C) (Oral)   Resp 12   LMP 03/26/2023 (Exact Date)   SpO2 98%   Neuro: WDL  Cardiac: WDL  Lungs: WDL  GI: Hypoactive bowel sounds.  : WDL  Pain: Denied overnight.  IV: LR @ 100 ml per hour  Meds: Metronidazole  Labs/tests: None overnight.  Diet: NPO  Activity: Independent  Misc: N/A  Plan: Continue gut rest per orders.    Patient is stable and on room air. Room air, on a NPO diet. Afebrile.  Will continue to monitor and provide cares.

## 2023-03-27 NOTE — ANESTHESIA PROCEDURE NOTES
Airway       Patient location during procedure: OR       Procedure Start/Stop Times: 3/27/2023 4:26 PM  Staff -        CRNA: Toshia Gomez APRN CRNA       Performed By: CRNA  Consent for Airway        Urgency: elective  Indications and Patient Condition       Indications for airway management: caleb-procedural         Mask difficulty assessment: 1 - vent by mask    Final Airway Details       Final airway type: endotracheal airway       Successful airway: ETT - single  Endotracheal Airway Details        ETT size (mm): 7.0       Cuffed: yes       Successful intubation technique: direct laryngoscopy       DL Blade Type: MAC 3       Grade View of Cords: 3       Adjucts: stylet       Position: Right       Measured from: gums/teeth       Secured at (cm): 23       Bite block used: None    Post intubation assessment        Placement verified by: capnometry, equal breath sounds and chest rise        Number of attempts at approach: 2       Number of other approaches attempted: 0       Secured with: plastic tape       Ease of procedure: easy       Dentition: Intact and Unchanged    Medication(s) Administered   Medication Administration Time: 3/27/2023 4:26 PM    Additional Comments       First attempt by CRNA, successful attempt by CALVIN

## 2023-03-27 NOTE — ANESTHESIA PREPROCEDURE EVALUATION
Anesthesia Pre-Procedure Evaluation    Patient: Ashley Lorenzo   MRN: [id number] : 1990        Procedure : Procedure(s):  Endoscopic retrograde cholangiopancreatogram          Past Medical History:   Diagnosis Date     Anxiety     no meds needed     Chickenpox      Mild postpartum depression       Past Surgical History:   Procedure Laterality Date     H STATISTIC AMNIOINFUSION  2012          MOUTH SURGERY      wisdom teeth      Allergies   Allergen Reactions     Cats      Dogs      Dust Mites      Penicillins      Pollen Extract/Tree Extract       Social History     Tobacco Use     Smoking status: Never     Smokeless tobacco: Never   Substance Use Topics     Alcohol use: Yes     Comment: rare      Wt Readings from Last 1 Encounters:   23 86.9 kg (191 lb 8 oz)        Anesthesia Evaluation            ROS/MED HX  ENT/Pulmonary:  - neg pulmonary ROS     Neurologic:  - neg neurologic ROS     Cardiovascular:  - neg cardiovascular ROS     METS/Exercise Tolerance: >4 METS    Hematologic:  - neg hematologic  ROS     Musculoskeletal:  - neg musculoskeletal ROS     GI/Hepatic:     (+) cholecystitis/cholelithiasis,     Renal/Genitourinary:  - neg Renal ROS     Endo:     (+) Obesity,     Psychiatric/Substance Use:     (+) psychiatric history anxiety and depression     Infectious Disease:  - neg infectious disease ROS     Malignancy:  - neg malignancy ROS     Other:  - neg other ROS          Physical Exam    Airway        Mallampati: II   TM distance: > 3 FB   Neck ROM: full   Mouth opening: > 3 cm    Respiratory Devices and Support         Dental           Cardiovascular   cardiovascular exam normal          Pulmonary   pulmonary exam normal                OUTSIDE LABS:  CBC:   Lab Results   Component Value Date    WBC 5.8 2023    WBC 8.2 2023    HGB 11.0 (L) 2023    HGB 12.3 2023    HCT 33.6 (L) 2023    HCT 38.4 2023     2023     2023     BMP:    Lab Results   Component Value Date     03/27/2023     03/26/2023    POTASSIUM 3.7 03/27/2023    POTASSIUM 3.7 03/26/2023    CHLORIDE 108 (H) 03/27/2023    CHLORIDE 103 03/26/2023    CO2 24 03/27/2023    CO2 24 03/26/2023    BUN 8.4 03/27/2023    BUN 9.3 03/26/2023    CR 0.70 03/27/2023    CR 0.73 03/26/2023     (H) 03/27/2023     (H) 03/26/2023     COAGS:   Lab Results   Component Value Date    INR 0.90 03/26/2023     POC:   Lab Results   Component Value Date    HCG Negative 03/26/2023     HEPATIC:   Lab Results   Component Value Date    ALBUMIN 3.4 (L) 03/27/2023    PROTTOTAL 5.8 (L) 03/27/2023     (HH) 03/27/2023     (H) 03/27/2023    ALKPHOS 285 (H) 03/27/2023    BILITOTAL 2.1 (H) 03/27/2023     OTHER:   Lab Results   Component Value Date    DONNA 8.6 03/27/2023    LIPASE >3,000 (H) 03/26/2023    TSH 1.61 03/20/2014       Anesthesia Plan    ASA Status:  2      Anesthesia Type: General.     - Airway: ETT   Induction: Propofol.   Maintenance: Balanced.        Consents    Anesthesia Plan(s) and associated risks, benefits, and realistic alternatives discussed. Questions answered and patient/representative(s) expressed understanding.    - Discussed:     - Discussed with:  Patient      - Extended Intubation/Ventilatory Support Discussed: No.      - Patient is DNR/DNI Status: No    Use of blood products discussed: No .     Postoperative Care    Pain management: IV analgesics, Oral pain medications.   PONV prophylaxis: Ondansetron (or other 5HT-3), Background Propofol Infusion     Comments:                Dario Harvey MD

## 2023-03-27 NOTE — H&P (VIEW-ONLY)
General Surgery Consultation    Ashley Lorenzo MRN# [id number]   Age: 32 year old YOB: 1990     Date of Admission:  3/26/2023    Reason for consult:            Abdominal pain, right upper quadrant       Requesting provider:            Hali Gregory PA-C                Assessment and Plan:   Assessment:   Ashley Lorenzo is a 32 year old female with cholelithiasis and hyperbilirubinemia concerning for possible choledocholithiasis.     Comorbidities:   has a past medical history of Anxiety, Chickenpox, and Mild postpartum depression (2011).      Plan:   Discussion had with [facility], due to resolution of pain and plateau of bilirubin level will plan for MRCP this morning. If stone shown in CBD will then need to have EUS/ERCP with interval cholecystectomy. If CBD open will plan for cholecystectomy today/tomorrow pending availability.  We discussed laparoscopic/robotic cholecystectomy  We have discussed the indication, alternatives, risks and expected recovery.  Specifically we have discussed incisions, scarring, postoperative infections, anesthesia, bleeding, blood transfusion, open conversion, common bile duct injury, injury to intra-abdominal organs, adhesions leading to bowel obstruction, retained common bile duct stone, bile leak, DVT, PE, hernia, post cholecystectomy diarrhea, recovery, postoperative dietary restrictions and physical limitations.  We have discussed the recommended interventions and treatments for these complications.  All questions have been answered to the best of my ability.                   Chief Complaint:   Abdominal pain, right upper quadrant     History is obtained from the patient.         History of Present Illness:   Ashley Lorenzo is a 32 year old female who presents with epigastric region right upper quadrant abdominal pain for the past several month.  The pain is intermittent.  She has had similar pain in the past.  There is an association with eating fatty foods.  Associated  nausea and bloating. At this point she states her admission pain and nausea seem resolving.             Past Medical History:     Past Medical History:   Diagnosis Date     Anxiety     no meds needed     Chickenpox      Mild postpartum depression 2011             Past Surgical History:     Past Surgical History:   Procedure Laterality Date     H STATISTIC AMNIOINFUSION  1/1/2012          MOUTH SURGERY      wisdom teeth             Social History:     Social History     Tobacco Use     Smoking status: Never     Smokeless tobacco: Never   Substance Use Topics     Alcohol use: Yes     Comment: rare             Family History:     Family History   Problem Relation Age of Onset     Cancer Maternal Grandmother         skin     Blood Disease Maternal Grandmother         anemia     Thyroid Disease Maternal Grandmother      Cardiovascular Maternal Grandmother         MI- pacemaker     Alzheimer Disease Maternal Grandmother      Diabetes Father      Lipids Father      Hypertension Father      Diabetes Paternal Grandmother      Diabetes Paternal Grandfather      Hypertension Paternal Grandfather      Lipids Paternal Grandfather      Cancer Mother         anemia     Dementia Maternal Grandfather      No family history of bleeding or clotting disorders.         Allergies:     Allergies   Allergen Reactions     Cats      Dogs      Dust Mites      Penicillins      Pollen Extract/Tree Extract              Medications:   No current facility-administered medications on file prior to encounter.  acetaminophen (TYLENOL) 500 MG tablet, Take 500 mg by mouth daily as needed for mild pain (migraines)  busPIRone (BUSPAR) 10 MG tablet, Take 10 mg by mouth 3 times daily as needed for anxiety  ibuprofen (ADVIL,MOTRIN) 600 MG tablet, Take 1 tablet (600 mg) by mouth every 6 hours as needed for moderate pain  UNABLE TO FIND, MEDICATION NAME: Rehana back and body (aspirin and caffeine)- 1 tablet daily prn for migraines        cefTRIAXone  2 g  "Intravenous Q24H     famotidine  20 mg Oral BID     metroNIDAZOLE  500 mg Intravenous Q8H     sodium chloride (PF)  3 mL Intracatheter Q8H            Review of Systems:   The 10 point review of systems is negative other than noted in the HPI.          Physical Exam:   /84 (BP Location: Left arm)   Pulse 61   Temp 98.2  F (36.8  C) (Oral)   Resp 16   Ht 1.588 m (5' 2.5\")   Wt 86.9 kg (191 lb 8 oz)   LMP 03/26/2023 (Exact Date)   SpO2 96%   BMI 34.47 kg/m    General - Well developed, well nourished female in no apparent distress  HEENT:  Head normocephalic and atraumatic, pupils equal and round, conjunctivae clear, no scleral icterus, mucous membranes moist, external ears and nose normal  Neck: Supple without thyromegaly or masses  Lymphatic: No cervical, or supraclavicular lymphadenopathy  Lungs: non-labored breathing  Heart: regular rate and rhythm, no murmurs  Abdomen: soft, flat, non-distended with mild tenderness noted in the epigastric region and in the right upper quadrant . no masses palpated  Extremities: Warm without edema  Neurologic: nonfocal  Psychiatric: Mood and affect appropriate  Skin: Without lesions, rashes, or jaundice         Data:     WBC -   Lab Results   Component Value Date    WBC 5.8 03/27/2023       HgB -   Lab Results   Component Value Date    HGB 11.0 (L) 03/27/2023       Plt-   Lab Results   Component Value Date     03/27/2023       Liver Function Studies -   Recent Labs   Lab Test 03/27/23  0637   PROTTOTAL 5.8*   ALBUMIN 3.4*   BILITOTAL 2.1*   ALKPHOS 285*   *   *       Lipase-   Lab Results   Component Value Date    LIPASE >3,000 (H) 03/26/2023         Imaging:  All imaging studies reviewed by me.    Results for orders placed or performed during the hospital encounter of 03/26/23   US Abdomen Limited    Narrative    EXAM: US ABDOMEN LIMITED  LOCATION: [facility]  DATE/TIME: 3/26/2023 6:11 PM    INDICATION: Right upper quadrant " abdominal pain.  COMPARISON: None.  TECHNIQUE: Limited abdominal ultrasound.    FINDINGS:    GALLBLADDER: Gallstones in an otherwise normal gallbladder. No wall thickening, or pericholecystic fluid. Negative sonographic Gagnon's sign.    BILE DUCTS: No biliary dilatation. The common duct measures 3 mm.    LIVER: Normal parenchyma with smooth contour. No focal mass.    RIGHT KIDNEY: No hydronephrosis.    PANCREAS: Head and body portions normal, tail obscured by bowel gas.    No ascites.      Impression    IMPRESSION:  1.  Cholelithiasis in an otherwise normal appearing gallbladder.  2.  Right upper quadrant ultrasound otherwise negative.             Time spent with the patient, reviewing the EMR, reviewing laboratory and imaging studies, more than 50% of which was counseling and coordinating care:  46 minutes.     Roby Wellington MD

## 2023-03-27 NOTE — INTERVAL H&P NOTE
"I have reviewed the surgical (or preoperative) H&P that is linked to this encounter, and examined the patient. There are no significant changes    Clinical Conditions Present on Arrival:  Clinically Significant Risk Factors Present on Admission                # Hypoalbuminemia: Lowest albumin = 3.4 g/dL at 3/27/2023  6:37 AM, will monitor as appropriate     # Obesity: Estimated body mass index is 34.47 kg/m  as calculated from the following:    Height as of this encounter: 1.588 m (5' 2.5\").    Weight as of this encounter: 86.9 kg (191 lb 8 oz).       "

## 2023-03-27 NOTE — ANESTHESIA POSTPROCEDURE EVALUATION
Patient: Ashley Lorenzo    Procedure: Procedure(s):  Endoscopic retrograde cholangiopancreatogram       Anesthesia Type:  General    Note:  Disposition: Inpatient   Postop Pain Control: Uneventful            Sign Out: Well controlled pain   PONV: No   Neuro/Psych: Uneventful            Sign Out: Acceptable/Baseline neuro status   Airway/Respiratory: Uneventful            Sign Out: Acceptable/Baseline resp. status   CV/Hemodynamics: Uneventful            Sign Out: Acceptable CV status; No obvious hypovolemia; No obvious fluid overload   Other NRE:    DID A NON-ROUTINE EVENT OCCUR? No           Last vitals:  Vitals Value Taken Time   /84 03/27/23 1814   Temp 97.7  F (36.5  C) 03/27/23 1814   Pulse 76 03/27/23 1814   Resp 16 03/27/23 1814   SpO2 98 % 03/27/23 1814       Electronically Signed By: Vanesa Donovan MD  March 27, 2023  6:21 PM

## 2023-03-27 NOTE — PROGRESS NOTES
"[facility]    Hospitalist Progress Note  Name: Ashley Lorenzo    MRN: [id number]  Provider:  Luther Madison DO  Date of Service: 03/27/2023    Summary of Stay: Ashley Lorenzo is a 32 year old female with a history of anxiety admitted on 3/26/2023 with abdominal pain with nausea and vomiting.  In the emergency department, the patient was found to have a temperature of 98.0  F, blood pressure 123/82, heart rate 67, respiratory rate 17, SPO2 100% on room air.  Initial lab work showed alkaline phosphatase 316, AST//737, total bilirubin 2.2, lipase greater than 3000, glucose 121.  Right upper quadrant ultrasound showed cholelithiasis with common bile duct measuring 3 mm.  The patient was made n.p.o., started on IV fluids, and provided with pain control and antiemetics for the treatment of suspected gallstone pancreatitis.  General surgery and gastroenterology were consulted to see the patient.    TODAY'S PLAN: Appreciate general surgery and gastroenterology recommendations.  Patient denies abdominal pain, but admits to right upper quadrant \"pressure\".  She denies any nausea or vomiting overnight and slept well.  Patient actually looks fairly good this morning.  LFTs slightly improved from arrival.  Patient had similar episode over the past week.  Abdominal ultrasound shows cholelithiasis with common bile duct measuring 3 mm.  Suspicion for gallstone pancreatitis, and gallstone may have passed at this point.  Remain n.p.o. pending GI and general surgery evaluations today.  Anticipate patient will need cholecystectomy at some point.    Problem List:   Acute Intractable Abdominal Pain, Nausea, and Vomiting  Suspected Gallstone Pancreatitis  Transaminitis  Cholelithiasis  - Appreciate Gastroenterology recommendations  - Appreciate General Surgery recommendations  - NPO  - Pain control  - Antiemetics  - IVF  - Continue ceftriaxone and metronidazole  - Daily Hepatic Panel    Chronic Medical " "Problems:  Anxiety    I spent 43 minutes in reviewing this patient's labs, imaging, medications, medical history.  In addition time was spent interviewing the patient, communicating with family, and medical decision making.     DVT Prophylaxis: Pneumatic Compression Devices  Code Status: Full Code  Diet: NPO for Medical/Clinical Reasons Except for: Meds    Anderson Catheter: Not present  Disposition: Expected discharge in 1-2 days to home. Goals prior to discharge include improvement in LFTs, tolerating oral diet, GI and General Surgery evaluation complete.   Family updated today: No     Interval History   Pt seen and examined.  Pt admits to RUQ \"pressure\" this morning.  No N/V.    -Data reviewed today: I personally reviewed all new labs and imaging results over the last 24 hours.     Physical Exam   Temp: 98.2  F (36.8  C) Temp src: Oral BP: 114/59 Pulse: 66   Resp: 12 SpO2: 98 % O2 Device: None (Room air)    There were no vitals filed for this visit.  Vital Signs with Ranges  Temp:  [98  F (36.7  C)-98.2  F (36.8  C)] 98.2  F (36.8  C)  Pulse:  [63-70] 66  Resp:  [12-18] 12  BP: (114-151)/(59-93) 114/59  SpO2:  [98 %-100 %] 98 %  No intake/output data recorded.    GENERAL: No apparent distress. Awake, alert, and fully oriented.  HEENT: Normocephalic, atraumatic. Extraocular movements intact.  CARDIOVASCULAR: Regular rate and rhythm without murmurs or rubs. No S3.  PULMONARY: Clear bilaterally.  GASTROINTESTINAL: Soft, non-tender, non-distended. Bowel sounds normoactive.   EXTREMITIES: No cyanosis or clubbing. No edema.  NEUROLOGICAL: CN 2-12 grossly intact, no focal neurological deficits.  DERMATOLOGICAL: No rash, ulcer, bruising, nor jaundice.    Medications     lactated ringers 100 mL/hr at 03/27/23 0505       cefTRIAXone  2 g Intravenous Q24H     famotidine  20 mg Oral BID     metroNIDAZOLE  500 mg Intravenous Q8H     sodium chloride (PF)  3 mL Intracatheter Q8H     Data     Laboratory:  Recent Labs   Lab " 03/27/23  0637 03/26/23  1445   WBC 5.8 8.2   HGB 11.0* 12.3   HCT 33.6* 38.4   MCV 90 91    348     Recent Labs   Lab 03/27/23  0637 03/26/23  1445    139   POTASSIUM 3.7 3.7   CHLORIDE 108* 103   CO2 24 24   ANIONGAP 7 12   * 121*   BUN 8.4 9.3   CR 0.70 0.73   GFRESTIMATED >90 >90   DONNA 8.6 9.4   PROTTOTAL 5.8* 7.5   ALBUMIN 3.4* 4.2   BILITOTAL 2.1* 2.2*   ALKPHOS 285* 316*   * 423*   * 737*     Recent Labs   Lab 03/26/23  1445   LIPASE >3,000*     No results for input(s): CULT in the last 168 hours.    Imaging:  Recent Results (from the past 24 hour(s))   US Abdomen Limited    Narrative    EXAM: US ABDOMEN LIMITED  LOCATION: [facility]  DATE/TIME: 3/26/2023 6:11 PM    INDICATION: Right upper quadrant abdominal pain.  COMPARISON: None.  TECHNIQUE: Limited abdominal ultrasound.    FINDINGS:    GALLBLADDER: Gallstones in an otherwise normal gallbladder. No wall thickening, or pericholecystic fluid. Negative sonographic Gagnon's sign.    BILE DUCTS: No biliary dilatation. The common duct measures 3 mm.    LIVER: Normal parenchyma with smooth contour. No focal mass.    RIGHT KIDNEY: No hydronephrosis.    PANCREAS: Head and body portions normal, tail obscured by bowel gas.    No ascites.      Impression    IMPRESSION:  1.  Cholelithiasis in an otherwise normal appearing gallbladder.  2.  Right upper quadrant ultrasound otherwise negative.             Luther Madison DO  Critical access hospital Hospitalist  [address] [place] [address]  Securely message with [employer] (more info)  Text page via [email or web address] Paging/Directory   03/27/2023

## 2023-03-27 NOTE — ANESTHESIA CARE TRANSFER NOTE
Patient: Ashley Lorenzo    Procedure: Procedure(s):  Endoscopic retrograde cholangiopancreatogram       Diagnosis: Choledocholithiasis [K80.50]  Diagnosis Additional Information: No value filed.    Anesthesia Type:   General     Note:    Oropharynx: oropharynx clear of all foreign objects  Level of Consciousness: drowsy  Oxygen Supplementation: face mask  Level of Supplemental Oxygen (L/min / FiO2): 6  Independent Airway: airway patency satisfactory and stable  Dentition: dentition unchanged  Vital Signs Stable: post-procedure vital signs reviewed and stable  Report to RN Given: handoff report given  Patient transferred to: PACU    Handoff Report: Identifed the Patient, Identified the Reponsible Provider, Reviewed the pertinent medical history, Discussed the surgical course, Reviewed Intra-OP anesthesia mangement and issues during anesthesia, Set expectations for post-procedure period and Allowed opportunity for questions and acknowledgement of understanding      Vitals:  Vitals Value Taken Time   /109 03/27/23 1651   Temp     Pulse 108 03/27/23 1654   Resp 20 03/27/23 1654   SpO2 100 % 03/27/23 1654   Vitals shown include unvalidated device data.    Electronically Signed By: TASNEEM Coe CRNA  March 27, 2023  4:55 PM

## 2023-03-27 NOTE — CONSULTS
[facility]  Gastroenterology Consultation    Ashley Lorenzo  [address]  [place] [address]  32 year old female    Admission Date/Time: 3/26/2023  Primary Care Provider: Lynn Almodovar    We were asked to see the patient in consultation by Hali Gregory PA-C for evaluation of pancreatitis, elevated LFTs.    HPI:  Ashley Lorenzo is a 32 year old female who has a past medical history of anxiety, headaches, cholelithiasis who was admitted on 3/26 regarding abdominal pain and nausea/vomiting.     Patient has had off and on right upper quadrant pain for the last several months but yesterday her pain began constant and severe.  This led to 1 episode of emesis, nonbloody.  Patient located her pain in the epigastric and right upper quadrant region radiating into her right back.  Patient denies any change to her bowel habits.  Currently, patient notes that her nausea has resolved and her pain has much improved; when asked today she notes mostly headache.     In the ED, LFTs are elevated.  T. bili 2.2 --> 2.1,  --> 451,  --> 688, alk phos 316 --> 285.  Lipase elevated at greater than 3,000.  BMP is largely unremarkable.  WBC within normal limits.  Ultrasound showing cholelithiasis, normal CBD.     Patient denies nicotine or alcohol use.  She uses NSAIDs roughly once monthly.  Denies abdominal surgeries.  Patient notes she does have family history of gallbladder issues/gallstones other than this is unsure family history.     ROS: A comprehensive ten point review of systems was negative aside from those in mentioned in the HPI.      MEDICATIONS: No current facility-administered medications on file prior to encounter.  acetaminophen (TYLENOL) 500 MG tablet, Take 500 mg by mouth daily as needed for mild pain (migraines)  busPIRone (BUSPAR) 10 MG tablet, Take 10 mg by mouth 3 times daily as needed for anxiety  ibuprofen (ADVIL,MOTRIN) 600 MG tablet, Take 1 tablet (600 mg) by mouth every 6  "hours as needed for moderate pain  UNABLE TO FIND, MEDICATION NAME: Rehana back and body (aspirin and caffeine)- 1 tablet daily prn for migraines        ALLERGIES:   Allergies   Allergen Reactions     Cats      Dogs      Dust Mites      Penicillins      Pollen Extract/Tree Extract        Past Medical History:   Diagnosis Date     Anxiety     no meds needed     Chickenpox      Mild postpartum depression 2011       Past Surgical History:   Procedure Laterality Date     H STATISTIC AMNIOINFUSION  1/1/2012          MOUTH SURGERY      wisdom teeth         SOCIAL HISTORY:  Social History     Tobacco Use     Smoking status: Never     Smokeless tobacco: Never   Substance Use Topics     Alcohol use: Yes     Comment: rare     Drug use: No       FAMILY HISTORY:  Family History   Problem Relation Age of Onset     Cancer Maternal Grandmother         skin     Blood Disease Maternal Grandmother         anemia     Thyroid Disease Maternal Grandmother      Cardiovascular Maternal Grandmother         MI- pacemaker     Alzheimer Disease Maternal Grandmother      Diabetes Father      Lipids Father      Hypertension Father      Diabetes Paternal Grandmother      Diabetes Paternal Grandfather      Hypertension Paternal Grandfather      Lipids Paternal Grandfather      Cancer Mother         anemia     Dementia Maternal Grandfather        PHYSICAL EXAM:   /84 (BP Location: Left arm)   Pulse 61   Temp 98.2  F (36.8  C) (Oral)   Resp 16   Ht 1.588 m (5' 2.5\")   Wt 86.9 kg (191 lb 8 oz)   LMP 03/26/2023 (Exact Date)   SpO2 96%   BMI 34.47 kg/m      Constitutional: NAD, comfortable, laying in bed.  Family member in room.  Cardiovascular: RRR, normal S1 and S2.  Respiratory: CTAB  Psychiatric: mentation appears normal and affect normal  Head: Normocephalic. Atraumatic.    Eyes:  PERRL, no icterus  ENT: Hearing adequate  Abdomen: Abdomen is soft, nondistended, mild discomfort to palpation in the right upper quadrant region. + BS. "   NEURO: grossly negative  SKIN: no suspicious lesions or rashes    ADDITIONAL COMMENTS:   I reviewed the patient's new clinical lab test results.   Recent Labs   Lab Test 03/27/23  0637 03/26/23  1445 06/23/15  0640 06/22/15  1200   WBC 5.8 8.2  --   --    HGB 11.0* 12.3 10.6* 11.4*   MCV 90 91  --   --     348  --  248   INR  --  0.90  --   --      Recent Labs   Lab Test 03/27/23  0637 03/26/23  1445    139   POTASSIUM 3.7 3.7   CHLORIDE 108* 103   CO2 24 24   BUN 8.4 9.3   CR 0.70 0.73   ANIONGAP 7 12   DONNA 8.6 9.4   * 121*     Recent Labs   Lab Test 03/27/23  0637 03/26/23  1642 03/26/23  1445   ALBUMIN 3.4*  --  4.2   BILITOTAL 2.1*  --  2.2*   *  --  737*   *  --  423*   ALKPHOS 285*  --  316*   PROTEIN  --  Negative  --    LIPASE  --   --  >3,000*     RUQ US 3/26/23:  IMPRESSION:  1.  Cholelithiasis in an otherwise normal appearing gallbladder.  2.  Right upper quadrant ultrasound otherwise negative.    CONSULTATION ASSESSMENT AND PLAN:    Principal Problem:    Transaminitis    Gallstone pancreatitis    Assessment: 32-year-old female who was admitted on 3/26 with right upper quadrant pain, nausea, vomiting was found to have gallstone pancreatitis and elevated LFTs.  Ultrasound with gallstones but without CBD dilatation.  T. bili today stable.  Patient's pain has much improved, she only notes minimal abdominal discomfort. Pancreatitis is likely gallstone related.  With her elevated LFTs, would like to pursue MRCP today to further evaluate her bile ducts although with negative ultrasound have a lower suspicion of current choledocholithiasis, stone has likely passed at this point and would expect LFTs to continue to trend down.  Surgery has also been consulted, appreciate recommendations.      Plan:   -- MRCP today  -- Continue to follow LFTs  -- IVF, analgesics, antiemetics per primary team  -- Continue NPO   -- Abx per primary team    I discussed the patient's findings and  plan with Dr. Powell. I will discuss with Dr. Evans, GI staff.      45 minutes spent on patient care including chart review, patient visit, documentation, coordination.       Monica Urrutia, GEOVANY  [facility]  Office:  [phone] call if needed after 5PM  Cell:  [phone], not available after 5PM at this number

## 2023-03-27 NOTE — PLAN OF CARE
Goal Outcome Evaluation:    Vital Signs: VSS. Afebrile. Spo2> 90% on RA  Pain/Comfort: Pain 3/10 with headache. Given ibuprofen x2 and oxycodone x1  Assessment: LS clear. Denies N/V. Abdomen rounded. BS hypoactive. LR infusing @ 100 ml/hr.  Diet: NPO  Output: adequate output  Activity/Ambulation: ambulating independently to bathroom  Social: Pt significant other at bedside.

## 2023-03-28 LAB
ALBUMIN SERPL BCG-MCNC: 3.5 G/DL (ref 3.5–5.2)
ALP SERPL-CCNC: 291 U/L (ref 35–104)
ALT SERPL W P-5'-P-CCNC: 695 U/L (ref 10–35)
ANION GAP SERPL CALCULATED.3IONS-SCNC: 14 MMOL/L (ref 7–15)
AST SERPL W P-5'-P-CCNC: 336 U/L (ref 10–35)
BILIRUB SERPL-MCNC: 1.2 MG/DL
BUN SERPL-MCNC: 9.3 MG/DL (ref 6–20)
CALCIUM SERPL-MCNC: 8.8 MG/DL (ref 8.6–10)
CHLORIDE SERPL-SCNC: 102 MMOL/L (ref 98–107)
CREAT SERPL-MCNC: 0.64 MG/DL (ref 0.51–0.95)
DEPRECATED HCO3 PLAS-SCNC: 22 MMOL/L (ref 22–29)
ERYTHROCYTE [DISTWIDTH] IN BLOOD BY AUTOMATED COUNT: 13.2 % (ref 10–15)
GFR SERPL CREATININE-BSD FRML MDRD: >90 ML/MIN/1.73M2
GLUCOSE SERPL-MCNC: 96 MG/DL (ref 70–99)
HCT VFR BLD AUTO: 34.4 % (ref 35–47)
HGB BLD-MCNC: 11.4 G/DL (ref 11.7–15.7)
LIPASE SERPL-CCNC: 48 U/L (ref 13–60)
MCH RBC QN AUTO: 29.7 PG (ref 26.5–33)
MCHC RBC AUTO-ENTMCNC: 33.1 G/DL (ref 31.5–36.5)
MCV RBC AUTO: 90 FL (ref 78–100)
PLATELET # BLD AUTO: 307 10E3/UL (ref 150–450)
POTASSIUM SERPL-SCNC: 4 MMOL/L (ref 3.4–5.3)
PROT SERPL-MCNC: 6.3 G/DL (ref 6.4–8.3)
RBC # BLD AUTO: 3.84 10E6/UL (ref 3.8–5.2)
SODIUM SERPL-SCNC: 138 MMOL/L (ref 136–145)
WBC # BLD AUTO: 8.3 10E3/UL (ref 4–11)

## 2023-03-28 PROCEDURE — 99232 SBSQ HOSP IP/OBS MODERATE 35: CPT | Performed by: INTERNAL MEDICINE

## 2023-03-28 PROCEDURE — 120N000001 HC R&B MED SURG/OB

## 2023-03-28 PROCEDURE — 83690 ASSAY OF LIPASE: CPT | Performed by: PHYSICIAN ASSISTANT

## 2023-03-28 PROCEDURE — 36415 COLL VENOUS BLD VENIPUNCTURE: CPT | Performed by: INTERNAL MEDICINE

## 2023-03-28 PROCEDURE — 250N000013 HC RX MED GY IP 250 OP 250 PS 637

## 2023-03-28 PROCEDURE — 85027 COMPLETE CBC AUTOMATED: CPT | Performed by: INTERNAL MEDICINE

## 2023-03-28 PROCEDURE — 80053 COMPREHEN METABOLIC PANEL: CPT | Performed by: INTERNAL MEDICINE

## 2023-03-28 PROCEDURE — 258N000003 HC RX IP 258 OP 636

## 2023-03-28 PROCEDURE — 250N000011 HC RX IP 250 OP 636

## 2023-03-28 PROCEDURE — 99231 SBSQ HOSP IP/OBS SF/LOW 25: CPT | Mod: FS | Performed by: SURGERY

## 2023-03-28 RX ADMIN — SODIUM CHLORIDE, POTASSIUM CHLORIDE, SODIUM LACTATE AND CALCIUM CHLORIDE: 600; 310; 30; 20 INJECTION, SOLUTION INTRAVENOUS at 20:34

## 2023-03-28 RX ADMIN — METRONIDAZOLE 500 MG: 500 INJECTION, SOLUTION INTRAVENOUS at 20:34

## 2023-03-28 RX ADMIN — SODIUM CHLORIDE, POTASSIUM CHLORIDE, SODIUM LACTATE AND CALCIUM CHLORIDE: 600; 310; 30; 20 INJECTION, SOLUTION INTRAVENOUS at 09:32

## 2023-03-28 RX ADMIN — FAMOTIDINE 20 MG: 20 TABLET, FILM COATED ORAL at 07:59

## 2023-03-28 RX ADMIN — FAMOTIDINE 20 MG: 20 TABLET, FILM COATED ORAL at 20:34

## 2023-03-28 RX ADMIN — METRONIDAZOLE 500 MG: 500 INJECTION, SOLUTION INTRAVENOUS at 12:39

## 2023-03-28 RX ADMIN — METRONIDAZOLE 500 MG: 500 INJECTION, SOLUTION INTRAVENOUS at 04:09

## 2023-03-28 RX ADMIN — CEFTRIAXONE 2 G: 2 INJECTION, POWDER, FOR SOLUTION INTRAMUSCULAR; INTRAVENOUS at 17:45

## 2023-03-28 ASSESSMENT — ACTIVITIES OF DAILY LIVING (ADL)
ADLS_ACUITY_SCORE: 21

## 2023-03-28 NOTE — PLAN OF CARE
VSS, Denies abdominal pain- states her RUQ just feels full occasionally. No N/V. Advanced diet to 'as tolerated/ low fat' today, pt is tolerating increase well. Will be NPO at midnight. Passing gas, no bowel movements yet. IV infusing maintenance fluids and scheduled IV antibiotics without difficulty. Resting comfortably at this time, family member at bedside and is supportive.

## 2023-03-28 NOTE — PROGRESS NOTES
"[facility]    Hospitalist Progress Note  Name: Ashley Lorenzo    MRN: [id number]  Provider:  Luther Madison DO  Date of Service: 03/28/2023    Summary of Stay: Ashley Lorenzo is a 32 year old female with a history of anxiety admitted on 3/26/2023 with abdominal pain with nausea and vomiting.  In the emergency department, the patient was found to have a temperature of 98.0  F, blood pressure 123/82, heart rate 67, respiratory rate 17, SPO2 100% on room air.  Initial lab work showed alkaline phosphatase 316, AST//737, total bilirubin 2.2, lipase greater than 3000, glucose 121.  Right upper quadrant ultrasound showed cholelithiasis with common bile duct measuring 3 mm.  The patient was made n.p.o., started on IV fluids, and provided with pain control and antiemetics for the treatment of suspected gallstone pancreatitis.  General surgery and gastroenterology were consulted to see the patient.  MRCP revealed cholelithiasis and gallbladder wall edema possibly representing cholecystitis, 2 tiny local signal foci in the proximal and distal common bile duct measuring 4 mm possibly representing choledocholithiasis with common bile duct measuring 4 mm.  On 3/27, the patient was taken for ERCP with biliary sphincterectomy and balloon extraction of 2 stones.    TODAY'S PLAN:  ERCP yesterday with sphincterectomy and removal of 2 stones.  Patient reports some \"pressure\" under her right rib, but denies any nausea or vomiting.  LFTs slightly increased from yesterday.  Clear liquid diet added, however patient has been minimizing her oral intake in hopes of having cholecystectomy here in the hospital.  Ultimately, await and appreciate general surgery recommendations regarding timing for cholecystectomy.  If no plans for cholecystectomy in hospital, would advance diet as tolerated with potential discharge home tomorrow as long as LFTs are improving.  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  Currently they are being taken care of by her mother.    Problem List:   Acute Intractable Abdominal Pain, Nausea, and Vomiting  Suspected Gallstone Pancreatitis s/p ERCP on 3/27  Transaminitis  Cholelithiasis  - Appreciate Gastroenterology recommendations  - Appreciate General Surgery recommendations  - s/p ERCP on 3/27 with biliary sphincterectomy and balloon extraction of 2 stones  - Clear liquid diet  - Pain control  - Antiemetics  - IVF  - Continue ceftriaxone and metronidazole  - Daily Hepatic Panel     Chronic Medical Problems:  Anxiety    I spent 43 minutes in reviewing this patient's labs, imaging, medications, medical history.  In addition time was spent interviewing the patient, communicating with family, and medical decision making.     DVT Prophylaxis: Pneumatic Compression Devices  Code Status: Full Code  Diet: Clear Liquid Diet    Anderson Catheter: Not present  Disposition: Expected discharge in 1-2 days to home. Goals prior to discharge include general surgery work up complete, tolerating oral diet.   Family updated today: No     Interval History   Pt seen and examined.  Pt admits to mild \"pressure\" under her right rib cage.  Denies cp, sob.  No n or v.    -Data reviewed today: I personally reviewed all new labs and imaging results over the last 24 hours.     Physical Exam   Temp: 97.7  F (36.5  C) Temp src: Oral BP: 120/76 Pulse: 66   Resp: 16 SpO2: 99 % O2 Device: None (Room air) Oxygen Delivery: 5 LPM  Vitals:    03/27/23 0844   Weight: 86.9 kg (191 lb 8 oz)     Vital Signs with Ranges  Temp:  [97.7  F (36.5  C)-98  F (36.7  C)] 97.7  F (36.5  C)  Pulse:  [] 66  Resp:  [12-43] 16  BP: (116-161)/() 120/76  SpO2:  [97 %-100 %] 99 %  I/O last 3 completed shifts:  In: 920 [P.O.:120; I.V.:800]  Out: -     GENERAL: No apparent distress. Awake, alert, and fully oriented.  HEENT: Normocephalic, atraumatic. Extraocular movements intact.  CARDIOVASCULAR: Regular rate and rhythm " without murmurs or rubs. No S3.  PULMONARY: Clear bilaterally.  GASTROINTESTINAL: Soft, non-tender, non-distended. Bowel sounds normoactive.   EXTREMITIES: No cyanosis or clubbing. No edema.  NEUROLOGICAL: CN 2-12 grossly intact, no focal neurological deficits.  DERMATOLOGICAL: No rash, ulcer, bruising, nor jaundice.    Medications     lactated ringers 100 mL/hr at 03/28/23 0932       cefTRIAXone  2 g Intravenous Q24H     famotidine  20 mg Oral BID     metroNIDAZOLE  500 mg Intravenous Q8H     sodium chloride (PF)  3 mL Intracatheter Q8H     Data     Laboratory:  Recent Labs   Lab 03/28/23  0645 03/27/23  0637 03/26/23  1445   WBC 8.3 5.8 8.2   HGB 11.4* 11.0* 12.3   HCT 34.4* 33.6* 38.4   MCV 90 90 91    263 348     Recent Labs   Lab 03/28/23  0645 03/27/23  0637 03/26/23  1445    139 139   POTASSIUM 4.0 3.7 3.7   CHLORIDE 102 108* 103   CO2 22 24 24   ANIONGAP 14 7 12   GLC 96 112* 121*   BUN 9.3 8.4 9.3   CR 0.64 0.70 0.73   GFRESTIMATED >90 >90 >90   DONNA 8.8 8.6 9.4   PROTTOTAL 6.3* 5.8* 7.5   ALBUMIN 3.5 3.4* 4.2   BILITOTAL 1.2 2.1* 2.2*   ALKPHOS 291* 285* 316*   * 451* 423*   * 688* 737*     No results for input(s): CULT in the last 168 hours.    Imaging:  Recent Results (from the past 24 hour(s))   MR Abdomen MRCP w/o & w Contrast    Narrative    MR ABDOMEN MRCP WITHOUT AND WITH CONTRAST   3/27/2023 11:45 AM     HISTORY: Elevated LFTs, gallstones, question common bile duct stone.    TECHNIQUE: Multiplanar, multiecho MRI was performed using T1, T2, and  in- and out-of-phase imaging. Pre- and postcontrast T1 images were  acquired after the administration of 9 mL Gadavist IV. MRCP images and  coronal 3-D thin section MRCP images, were acquired on the scanner  through the biliary tree. 3-D image reformatting was performed on the  acquisition scanner.    COMPARISON: Ultrasound abdomen 3/26/2023.    FINDINGS:  HEPATOBILIARY: MRCP demonstrates several gallstones in the  gallbladder  lumen. There is mild gallbladder wall edema. Question of a small  distal common bile duct filling defect measuring approximately 4 mm  series 6 image 64. The common bile duct itself is not dilated  measuring 4 mm. Another small proximal common bile duct filling defect  is 4 mm series 6 image 71. Suggestion of a degree of fatty liver. No  focal worrisome hepatic observation identified.    PANCREAS: Normal.    SPLEEN: Normal.    ADRENAL GLANDS: Normal.    KIDNEYS/BLADDER: Normal.    BOWEL: No acute abnormality identified.    ADDITIONAL FINDINGS: None.    MUSCULOSKELETAL: Normal.      Impression    IMPRESSION:   1.  Cholelithiasis and gallbladder wall edema that may represent  cholecystitis.  2.  There are two tiny low signal foci in the proximal and distal  common bile duct each measuring 4 mm that could be tiny areas of  choledocholithiasis. No biliary ductal dilatation is seen. Common bile  duct is 4 mm in diameter.      PENG CARROLL MD         SYSTEM ID:  [id number]   XR Surgery TYREL L/T 5 Min Fluoro w Stills    Narrative    This exam was marked as non-reportable because it will not be read by a   radiologist or a [facility] non-radiologist provider.               Luther Madison DO  [facility] Hospitalist  [address] [place] [address]  Securely message with [employer] (more info)  Text page via [facility] Paging/Directory   03/28/2023

## 2023-03-28 NOTE — PROGRESS NOTES
"[facility]  [facility]  Gastroenterology Progress note    Interval History:    No abdominal pain- states her RUQ feels full or inflamed. No N/V. Tolerating sips of water. Passing gas, no bowel movements yet.     Vital Signs:      /76 (BP Location: Right arm)   Pulse 66   Temp 97.7  F (36.5  C) (Oral)   Resp 16   Ht 1.588 m (5' 2.5\")   Wt 86.9 kg (191 lb 8 oz)   LMP 2023 (Exact Date)   SpO2 99%   BMI 34.47 kg/m    Temp (24hrs), Av.8  F (36.6  C), Min:97.7  F (36.5  C), Max:98  F (36.7  C)    Patient Vitals for the past 72 hrs:   Weight   23 0844 86.9 kg (191 lb 8 oz)       Intake/Output Summary (Last 24 hours) at 3/28/2023 1051  Last data filed at 3/28/2023 0409  Gross per 24 hour   Intake 920 ml   Output --   Net 920 ml     Constitutional: NAD, comfortable, sitting up in bed   Respiratory: Non-labored  Abdomen: soft, non-tender, nondistended, +BS    Additional Comments:  ROS, FH, SH: See initial GI consult for details.    Laboratory Data:  Recent Labs   Lab Test 23  0645 23  0637 23  1445   WBC 8.3 5.8 8.2   HGB 11.4* 11.0* 12.3   MCV 90 90 91    263 348   INR  --   --  0.90     Recent Labs   Lab Test 23  0645 23  0637 23  1445    139 139   POTASSIUM 4.0 3.7 3.7   CHLORIDE 102 108* 103   CO2 22 24 24   BUN 9.3 8.4 9.3   CR 0.64 0.70 0.73   ANIONGAP 14 7 12   DONNA 8.8 8.6 9.4     Recent Labs   Lab Test 23  0645 23  0637 23  1642 23  1445   ALBUMIN 3.5 3.4*  --  4.2   BILITOTAL 1.2 2.1*  --  2.2*   * 688*  --  737*   * 451*  --  423*   ALKPHOS 291* 285*  --  316*   PROTEIN  --   --  Negative  --    LIPASE 48  --   --  >3,000*       RUQ US 3/26/23:  IMPRESSION:  1.  Cholelithiasis in an otherwise normal appearing gallbladder.  2.  Right upper quadrant ultrasound otherwise negative.    MRCP 3/27/23:  IMPRESSION:   1.  Cholelithiasis and gallbladder wall edema that " may represent  cholecystitis.  2.  There are two tiny low signal foci in the proximal and distal  common bile duct each measuring 4 mm that could be tiny areas of  choledocholithiasis. No biliary ductal dilatation is seen. Common bile  duct is 4 mm in diameter.      ERCP 3/27/23 (Powell):  - Choledocholithiasis was found. Complete removal was accomplished by biliary sphincterotomy and balloon extraction.      CONSULTATION ASSESSMENT AND PLAN:    Principal Problem:    Transaminitis    Gallstone pancreatitis    Choledocholithiasis     Assessment: 32-year-old female who was admitted on 3/26 with right upper quadrant pain, nausea, vomiting was found to have gallstone pancreatitis and elevated LFTs.  Ultrasound with gallstones but without CBD dilatation.  T. bili today stable.  Patient's pain has much improved, she only notes minimal abdominal discomfort. Pancreatitis is likely gallstone related.  MRCP with CBD stones, ERCP yesterday for removal and sphincterotomy. Tbili normal today. AST trending down but ALT and alk phos slightly trending up. Lipase normal today. Surgery has been consulted, planning on lap dalila tomorrow.     Plan:  -- Continue to follow LFTs, expect these to start trending down.   -- IVF, analgesics, antiemetics per primary team.  -- Ok to advance diet as tolerated to low fat. Surgery planning lap dalila tomorrow, NPO at midnight.     Will discuss with Dr. Evans, GI staff.    20 minutes spent on patient care including chart review, patient visit, documentation,  coordination.     Monica Urrutia PA-C  [facility]  Cell: [phone] until 12PM  Office: [phone]

## 2023-03-28 NOTE — PLAN OF CARE
Health Maintenance Due   Topic Date Due   • Shingles Vaccine (1 of 2) Never done   • COVID-19 Vaccine (4 - Booster for Pfizer series) 05/18/2022   • Depression Screening  09/07/2022       Patient is up to date, no discussion needed.   Pt. transferred from PACU s/p ERCP around 1925. Pt. alert & Ox4, oriented to call light & unit routines. Vitals & assessments WNL. Pt. denies pain or nausea. Rt. PIV with LR at 100cc/hour.  Pt. tolerating clear liquid diet. Pt. up with SBA to bathroom & able to empty bladder independently. No stools & not passing gas. Pt. resting between cares & making her needs known. Call light in reach. Continue to follow POC.

## 2023-03-28 NOTE — PROGRESS NOTES
"[facility]  General Surgery Progress Note         Assessment and Plan:   Assessment:   Cholelithiasis, choledocholithiasis, elevated LFTs  -s/p 3/27 ERCP with sphincterotomy and common duct clearance  -morning LFTs slightly more elevated today, TBili WNL  -Afebrile      Plan:   -Lipase added to am labs  -OK to have diet as tolerated today.  Will arrange robotic dalila tomorrow.  NPO after midnight.     Addendum  Pt feeling well, tolerating diet today. No pain, just fullness in the RUQ  Lipase wnl  Unfortunately no time for OR today. Robotic Dalila tomorrow with Dr Wellington.  Answered questions re surgery and recovery  Mei Bradley MD          Interval History:   Feeling better today, but \"fullness\" or \"swelling\" feeling in RUQ.  Holding off on oral intake in case she can have surgery today.           Physical Exam:   Blood pressure 120/76, pulse 66, temperature 97.7  F (36.5  C), temperature source Oral, resp. rate 16, height 1.588 m (5' 2.5\"), weight 86.9 kg (191 lb 8 oz), last menstrual period 03/26/2023, SpO2 99 %, unknown if currently breastfeeding.    I/O last 3 completed shifts:  In: 920 [P.O.:120; I.V.:800]  Out: -     Constitutional:   awake, alert, cooperative, no apparent distress, and appears stated age     Abdomen:   Normal bowel sounds, nontender, no distension.  \"fullness\" area is not really tender, under ribs.             Data:     Recent Labs   Lab 03/28/23  0645 03/27/23  0637 03/26/23  1445   WBC 8.3 5.8 8.2   HGB 11.4* 11.0* 12.3   HCT 34.4* 33.6* 38.4   MCV 90 90 91    263 348     Recent Labs   Lab 03/28/23  0645 03/27/23  0637 03/26/23  1445    139 139   POTASSIUM 4.0 3.7 3.7   CHLORIDE 102 108* 103   CO2 22 24 24   ANIONGAP 14 7 12   GLC 96 112* 121*   BUN 9.3 8.4 9.3   CR 0.64 0.70 0.73   GFRESTIMATED >90 >90 >90   DONNA 8.8 8.6 9.4   PROTTOTAL 6.3* 5.8* 7.5   ALBUMIN 3.5 3.4* 4.2   BILITOTAL 1.2 2.1* 2.2*   ALKPHOS 291* 285* 316*   * 451* 423*   * 478* " 737*     3/27 ERCP  Choledocholithiasis was found. Complete removal was accomplished by biliary sphincterotomy and balloon extraction.    Loida Chauhan PA-C

## 2023-03-29 ENCOUNTER — APPOINTMENT (OUTPATIENT)
Dept: SURGERY | Facility: PHYSICIAN GROUP | Age: 33
End: 2023-03-29
Payer: COMMERCIAL

## 2023-03-29 ENCOUNTER — ANESTHESIA EVENT (OUTPATIENT)
Dept: SURGERY | Facility: CLINIC | Age: 33
End: 2023-03-29
Payer: COMMERCIAL

## 2023-03-29 ENCOUNTER — ANESTHESIA (OUTPATIENT)
Dept: SURGERY | Facility: CLINIC | Age: 33
End: 2023-03-29
Payer: COMMERCIAL

## 2023-03-29 VITALS
TEMPERATURE: 98.2 F | HEART RATE: 66 BPM | WEIGHT: 191.5 LBS | HEIGHT: 63 IN | RESPIRATION RATE: 16 BRPM | SYSTOLIC BLOOD PRESSURE: 133 MMHG | DIASTOLIC BLOOD PRESSURE: 90 MMHG | OXYGEN SATURATION: 98 % | BODY MASS INDEX: 33.93 KG/M2

## 2023-03-29 LAB
ALBUMIN SERPL BCG-MCNC: 3.1 G/DL (ref 3.5–5.2)
ALP SERPL-CCNC: 215 U/L (ref 35–104)
ALT SERPL W P-5'-P-CCNC: 582 U/L (ref 10–35)
ANION GAP SERPL CALCULATED.3IONS-SCNC: 8 MMOL/L (ref 7–15)
AST SERPL W P-5'-P-CCNC: 237 U/L (ref 10–35)
BILIRUB SERPL-MCNC: 0.7 MG/DL
BUN SERPL-MCNC: 14.8 MG/DL (ref 6–20)
CALCIUM SERPL-MCNC: 8.6 MG/DL (ref 8.6–10)
CHLORIDE SERPL-SCNC: 106 MMOL/L (ref 98–107)
CREAT SERPL-MCNC: 0.72 MG/DL (ref 0.51–0.95)
DEPRECATED HCO3 PLAS-SCNC: 25 MMOL/L (ref 22–29)
ERYTHROCYTE [DISTWIDTH] IN BLOOD BY AUTOMATED COUNT: 13.7 % (ref 10–15)
GFR SERPL CREATININE-BSD FRML MDRD: >90 ML/MIN/1.73M2
GLUCOSE SERPL-MCNC: 96 MG/DL (ref 70–99)
HCT VFR BLD AUTO: 35.4 % (ref 35–47)
HGB BLD-MCNC: 11.5 G/DL (ref 11.7–15.7)
MCH RBC QN AUTO: 29.7 PG (ref 26.5–33)
MCHC RBC AUTO-ENTMCNC: 32.5 G/DL (ref 31.5–36.5)
MCV RBC AUTO: 92 FL (ref 78–100)
PLATELET # BLD AUTO: 304 10E3/UL (ref 150–450)
POTASSIUM SERPL-SCNC: 3.9 MMOL/L (ref 3.4–5.3)
PROT SERPL-MCNC: 5.7 G/DL (ref 6.4–8.3)
RBC # BLD AUTO: 3.87 10E6/UL (ref 3.8–5.2)
SODIUM SERPL-SCNC: 139 MMOL/L (ref 136–145)
WBC # BLD AUTO: 5 10E3/UL (ref 4–11)

## 2023-03-29 PROCEDURE — 250N000025 HC SEVOFLURANE, PER MIN: Performed by: SURGERY

## 2023-03-29 PROCEDURE — 250N000009 HC RX 250

## 2023-03-29 PROCEDURE — 710N000009 HC RECOVERY PHASE 1, LEVEL 1, PER MIN: Performed by: SURGERY

## 2023-03-29 PROCEDURE — 258N000003 HC RX IP 258 OP 636: Performed by: ANESTHESIOLOGY

## 2023-03-29 PROCEDURE — 258N000003 HC RX IP 258 OP 636

## 2023-03-29 PROCEDURE — 360N000080 HC SURGERY LEVEL 7, PER MIN: Performed by: SURGERY

## 2023-03-29 PROCEDURE — 36415 COLL VENOUS BLD VENIPUNCTURE: CPT | Performed by: INTERNAL MEDICINE

## 2023-03-29 PROCEDURE — 250N000013 HC RX MED GY IP 250 OP 250 PS 637

## 2023-03-29 PROCEDURE — 250N000011 HC RX IP 250 OP 636

## 2023-03-29 PROCEDURE — 8E0W4CZ ROBOTIC ASSISTED PROCEDURE OF TRUNK REGION, PERCUTANEOUS ENDOSCOPIC APPROACH: ICD-10-PCS | Performed by: SURGERY

## 2023-03-29 PROCEDURE — 272N000001 HC OR GENERAL SUPPLY STERILE: Performed by: SURGERY

## 2023-03-29 PROCEDURE — 85027 COMPLETE CBC AUTOMATED: CPT | Performed by: INTERNAL MEDICINE

## 2023-03-29 PROCEDURE — 99239 HOSP IP/OBS DSCHRG MGMT >30: CPT | Performed by: INTERNAL MEDICINE

## 2023-03-29 PROCEDURE — 370N000017 HC ANESTHESIA TECHNICAL FEE, PER MIN: Performed by: SURGERY

## 2023-03-29 PROCEDURE — 47563 LAPARO CHOLECYSTECTOMY/GRAPH: CPT | Mod: AS | Performed by: PHYSICIAN ASSISTANT

## 2023-03-29 PROCEDURE — 999N000141 HC STATISTIC PRE-PROCEDURE NURSING ASSESSMENT: Performed by: SURGERY

## 2023-03-29 PROCEDURE — 250N000013 HC RX MED GY IP 250 OP 250 PS 637: Performed by: INTERNAL MEDICINE

## 2023-03-29 PROCEDURE — 250N000009 HC RX 250: Performed by: SURGERY

## 2023-03-29 PROCEDURE — 88304 TISSUE EXAM BY PATHOLOGIST: CPT | Mod: TC | Performed by: SURGERY

## 2023-03-29 PROCEDURE — 80053 COMPREHEN METABOLIC PANEL: CPT | Performed by: INTERNAL MEDICINE

## 2023-03-29 PROCEDURE — 710N000012 HC RECOVERY PHASE 2, PER MINUTE: Performed by: SURGERY

## 2023-03-29 PROCEDURE — 250N000013 HC RX MED GY IP 250 OP 250 PS 637: Performed by: ANESTHESIOLOGY

## 2023-03-29 PROCEDURE — 0FT44ZZ RESECTION OF GALLBLADDER, PERCUTANEOUS ENDOSCOPIC APPROACH: ICD-10-PCS | Performed by: SURGERY

## 2023-03-29 PROCEDURE — BF50200 OTHER IMAGING OF BILE DUCTS USING FLUORESCING AGENT, INDOCYANINE GREEN DYE, INTRAOPERATIVE: ICD-10-PCS | Performed by: SURGERY

## 2023-03-29 PROCEDURE — 250N000013 HC RX MED GY IP 250 OP 250 PS 637: Performed by: SURGERY

## 2023-03-29 PROCEDURE — 250N000011 HC RX IP 250 OP 636: Performed by: ANESTHESIOLOGY

## 2023-03-29 PROCEDURE — 47563 LAPARO CHOLECYSTECTOMY/GRAPH: CPT | Performed by: SURGERY

## 2023-03-29 PROCEDURE — 88304 TISSUE EXAM BY PATHOLOGIST: CPT | Mod: 26 | Performed by: PATHOLOGY

## 2023-03-29 RX ORDER — BUPIVACAINE HYDROCHLORIDE AND EPINEPHRINE 5; 5 MG/ML; UG/ML
INJECTION, SOLUTION PERINEURAL PRN
Status: DISCONTINUED | OUTPATIENT
Start: 2023-03-29 | End: 2023-03-29 | Stop reason: HOSPADM

## 2023-03-29 RX ORDER — ONDANSETRON 2 MG/ML
4 INJECTION INTRAMUSCULAR; INTRAVENOUS EVERY 30 MIN PRN
Status: DISCONTINUED | OUTPATIENT
Start: 2023-03-29 | End: 2023-03-29 | Stop reason: HOSPADM

## 2023-03-29 RX ORDER — PROPOFOL 10 MG/ML
INJECTION, EMULSION INTRAVENOUS PRN
Status: DISCONTINUED | OUTPATIENT
Start: 2023-03-29 | End: 2023-03-29

## 2023-03-29 RX ORDER — ALBUTEROL SULFATE 0.83 MG/ML
2.5 SOLUTION RESPIRATORY (INHALATION) EVERY 4 HOURS PRN
Status: DISCONTINUED | OUTPATIENT
Start: 2023-03-29 | End: 2023-03-29 | Stop reason: HOSPADM

## 2023-03-29 RX ORDER — ONDANSETRON 2 MG/ML
INJECTION INTRAMUSCULAR; INTRAVENOUS PRN
Status: DISCONTINUED | OUTPATIENT
Start: 2023-03-29 | End: 2023-03-29

## 2023-03-29 RX ORDER — MAGNESIUM SULFATE HEPTAHYDRATE 40 MG/ML
2 INJECTION, SOLUTION INTRAVENOUS
Status: DISCONTINUED | OUTPATIENT
Start: 2023-03-29 | End: 2023-03-29 | Stop reason: HOSPADM

## 2023-03-29 RX ORDER — DEXAMETHASONE SODIUM PHOSPHATE 4 MG/ML
INJECTION, SOLUTION INTRA-ARTICULAR; INTRALESIONAL; INTRAMUSCULAR; INTRAVENOUS; SOFT TISSUE PRN
Status: DISCONTINUED | OUTPATIENT
Start: 2023-03-29 | End: 2023-03-29

## 2023-03-29 RX ORDER — SODIUM CHLORIDE, SODIUM LACTATE, POTASSIUM CHLORIDE, CALCIUM CHLORIDE 600; 310; 30; 20 MG/100ML; MG/100ML; MG/100ML; MG/100ML
INJECTION, SOLUTION INTRAVENOUS CONTINUOUS
Status: DISCONTINUED | OUTPATIENT
Start: 2023-03-29 | End: 2023-03-29 | Stop reason: HOSPADM

## 2023-03-29 RX ORDER — ONDANSETRON 4 MG/1
4 TABLET, ORALLY DISINTEGRATING ORAL EVERY 30 MIN PRN
Status: DISCONTINUED | OUTPATIENT
Start: 2023-03-29 | End: 2023-03-29 | Stop reason: HOSPADM

## 2023-03-29 RX ORDER — METHADONE HYDROCHLORIDE 10 MG/ML
2 INJECTION, SOLUTION INTRAMUSCULAR; INTRAVENOUS; SUBCUTANEOUS 3 TIMES DAILY PRN
Status: DISCONTINUED | OUTPATIENT
Start: 2023-03-29 | End: 2023-03-29 | Stop reason: HOSPADM

## 2023-03-29 RX ORDER — INDOCYANINE GREEN AND WATER 25 MG
2.5 KIT INJECTION ONCE
Status: COMPLETED | OUTPATIENT
Start: 2023-03-29 | End: 2023-03-29

## 2023-03-29 RX ORDER — CITRIC ACID/SODIUM CITRATE 334-500MG
30 SOLUTION, ORAL ORAL ONCE
Status: COMPLETED | OUTPATIENT
Start: 2023-03-29 | End: 2023-03-29

## 2023-03-29 RX ORDER — METHADONE HYDROCHLORIDE 10 MG/ML
INJECTION, SOLUTION INTRAMUSCULAR; INTRAVENOUS; SUBCUTANEOUS PRN
Status: DISCONTINUED | OUTPATIENT
Start: 2023-03-29 | End: 2023-03-29

## 2023-03-29 RX ORDER — OXYCODONE HYDROCHLORIDE 5 MG/1
5-10 TABLET ORAL EVERY 4 HOURS PRN
Qty: 10 TABLET | Refills: 0 | Status: SHIPPED | OUTPATIENT
Start: 2023-03-29

## 2023-03-29 RX ORDER — CEFAZOLIN SODIUM 2 G/100ML
2 INJECTION, SOLUTION INTRAVENOUS SEE ADMIN INSTRUCTIONS
Status: DISCONTINUED | OUTPATIENT
Start: 2023-03-29 | End: 2023-03-29 | Stop reason: HOSPADM

## 2023-03-29 RX ORDER — HYDROXYZINE HYDROCHLORIDE 25 MG/1
25 TABLET, FILM COATED ORAL EVERY 6 HOURS PRN
Status: DISCONTINUED | OUTPATIENT
Start: 2023-03-29 | End: 2023-03-29 | Stop reason: HOSPADM

## 2023-03-29 RX ORDER — HYDRALAZINE HYDROCHLORIDE 20 MG/ML
2.5-5 INJECTION INTRAMUSCULAR; INTRAVENOUS EVERY 10 MIN PRN
Status: DISCONTINUED | OUTPATIENT
Start: 2023-03-29 | End: 2023-03-29 | Stop reason: HOSPADM

## 2023-03-29 RX ORDER — KETOROLAC TROMETHAMINE 30 MG/ML
INJECTION, SOLUTION INTRAMUSCULAR; INTRAVENOUS PRN
Status: DISCONTINUED | OUTPATIENT
Start: 2023-03-29 | End: 2023-03-29

## 2023-03-29 RX ORDER — OXYCODONE HYDROCHLORIDE 5 MG/1
5 TABLET ORAL
Status: COMPLETED | OUTPATIENT
Start: 2023-03-29 | End: 2023-03-29

## 2023-03-29 RX ORDER — LIDOCAINE 40 MG/G
CREAM TOPICAL
Status: DISCONTINUED | OUTPATIENT
Start: 2023-03-29 | End: 2023-03-29 | Stop reason: HOSPADM

## 2023-03-29 RX ORDER — FENTANYL CITRATE 50 UG/ML
25 INJECTION, SOLUTION INTRAMUSCULAR; INTRAVENOUS EVERY 5 MIN PRN
Status: DISCONTINUED | OUTPATIENT
Start: 2023-03-29 | End: 2023-03-29 | Stop reason: HOSPADM

## 2023-03-29 RX ORDER — HYDROXYZINE HYDROCHLORIDE 50 MG/1
50 TABLET, FILM COATED ORAL EVERY 6 HOURS PRN
Status: DISCONTINUED | OUTPATIENT
Start: 2023-03-29 | End: 2023-03-29 | Stop reason: HOSPADM

## 2023-03-29 RX ORDER — LABETALOL HYDROCHLORIDE 5 MG/ML
10 INJECTION, SOLUTION INTRAVENOUS
Status: DISCONTINUED | OUTPATIENT
Start: 2023-03-29 | End: 2023-03-29 | Stop reason: HOSPADM

## 2023-03-29 RX ORDER — LIDOCAINE HYDROCHLORIDE 40 MG/ML
SOLUTION TOPICAL PRN
Status: DISCONTINUED | OUTPATIENT
Start: 2023-03-29 | End: 2023-03-29

## 2023-03-29 RX ORDER — CEFAZOLIN SODIUM 2 G/100ML
2 INJECTION, SOLUTION INTRAVENOUS
Status: DISCONTINUED | OUTPATIENT
Start: 2023-03-29 | End: 2023-03-29

## 2023-03-29 RX ORDER — BUSPIRONE HYDROCHLORIDE 10 MG/1
10 TABLET ORAL 3 TIMES DAILY PRN
Status: DISCONTINUED | OUTPATIENT
Start: 2023-03-29 | End: 2023-03-29 | Stop reason: HOSPADM

## 2023-03-29 RX ORDER — LIDOCAINE HYDROCHLORIDE 20 MG/ML
INJECTION, SOLUTION INFILTRATION; PERINEURAL PRN
Status: DISCONTINUED | OUTPATIENT
Start: 2023-03-29 | End: 2023-03-29

## 2023-03-29 RX ORDER — KETOROLAC TROMETHAMINE 15 MG/ML
15 INJECTION, SOLUTION INTRAMUSCULAR; INTRAVENOUS
Status: COMPLETED | OUTPATIENT
Start: 2023-03-29 | End: 2023-03-29

## 2023-03-29 RX ADMIN — OXYCODONE HYDROCHLORIDE 5 MG: 5 TABLET ORAL at 18:01

## 2023-03-29 RX ADMIN — ROCURONIUM BROMIDE 50 MG: 50 INJECTION, SOLUTION INTRAVENOUS at 15:04

## 2023-03-29 RX ADMIN — SODIUM CHLORIDE, POTASSIUM CHLORIDE, SODIUM LACTATE AND CALCIUM CHLORIDE: 600; 310; 30; 20 INJECTION, SOLUTION INTRAVENOUS at 08:01

## 2023-03-29 RX ADMIN — METRONIDAZOLE 500 MG: 500 INJECTION, SOLUTION INTRAVENOUS at 12:26

## 2023-03-29 RX ADMIN — LIDOCAINE HYDROCHLORIDE 4 ML: 40 SOLUTION TOPICAL at 15:08

## 2023-03-29 RX ADMIN — METRONIDAZOLE 500 MG: 500 INJECTION, SOLUTION INTRAVENOUS at 04:04

## 2023-03-29 RX ADMIN — MIDAZOLAM 2 MG: 1 INJECTION INTRAMUSCULAR; INTRAVENOUS at 14:58

## 2023-03-29 RX ADMIN — SODIUM CITRATE AND CITRIC ACID MONOHYDRATE 30 ML: 500; 334 SOLUTION ORAL at 16:43

## 2023-03-29 RX ADMIN — SUGAMMADEX 200 MG: 100 INJECTION, SOLUTION INTRAVENOUS at 15:50

## 2023-03-29 RX ADMIN — PROPOFOL 150 MG: 10 INJECTION, EMULSION INTRAVENOUS at 15:04

## 2023-03-29 RX ADMIN — BUSPIRONE HYDROCHLORIDE 10 MG: 10 TABLET ORAL at 11:33

## 2023-03-29 RX ADMIN — DEXMEDETOMIDINE HYDROCHLORIDE 0.5 MCG/KG/HR: 100 INJECTION, SOLUTION INTRAVENOUS at 15:07

## 2023-03-29 RX ADMIN — SENNOSIDES AND DOCUSATE SODIUM 1 TABLET: 50; 8.6 TABLET ORAL at 00:26

## 2023-03-29 RX ADMIN — Medication 10 MG: at 15:04

## 2023-03-29 RX ADMIN — METHADONE HYDROCHLORIDE 2 MG: 10 INJECTION, SOLUTION INTRAMUSCULAR; INTRAVENOUS; SUBCUTANEOUS at 16:45

## 2023-03-29 RX ADMIN — KETOROLAC TROMETHAMINE 15 MG: 15 INJECTION, SOLUTION INTRAMUSCULAR; INTRAVENOUS at 17:04

## 2023-03-29 RX ADMIN — DEXAMETHASONE SODIUM PHOSPHATE 8 MG: 4 INJECTION, SOLUTION INTRA-ARTICULAR; INTRALESIONAL; INTRAMUSCULAR; INTRAVENOUS; SOFT TISSUE at 15:04

## 2023-03-29 RX ADMIN — ONDANSETRON 4 MG: 2 INJECTION INTRAMUSCULAR; INTRAVENOUS at 15:04

## 2023-03-29 RX ADMIN — KETOROLAC TROMETHAMINE 15 MG: 30 INJECTION, SOLUTION INTRAMUSCULAR at 15:04

## 2023-03-29 RX ADMIN — SODIUM CHLORIDE, POTASSIUM CHLORIDE, SODIUM LACTATE AND CALCIUM CHLORIDE: 600; 310; 30; 20 INJECTION, SOLUTION INTRAVENOUS at 16:57

## 2023-03-29 RX ADMIN — SODIUM CHLORIDE, POTASSIUM CHLORIDE, SODIUM LACTATE AND CALCIUM CHLORIDE: 600; 310; 30; 20 INJECTION, SOLUTION INTRAVENOUS at 15:30

## 2023-03-29 RX ADMIN — METHADONE HYDROCHLORIDE 2 MG: 10 INJECTION, SOLUTION INTRAMUSCULAR; INTRAVENOUS; SUBCUTANEOUS at 16:27

## 2023-03-29 RX ADMIN — LIDOCAINE HYDROCHLORIDE 50 MG: 20 INJECTION, SOLUTION INFILTRATION; PERINEURAL at 15:04

## 2023-03-29 RX ADMIN — INDOCYANINE GREEN AND WATER 2.5 MG: KIT at 13:09

## 2023-03-29 RX ADMIN — IBUPROFEN 600 MG: 600 TABLET, FILM COATED ORAL at 00:09

## 2023-03-29 ASSESSMENT — ACTIVITIES OF DAILY LIVING (ADL)
ADLS_ACUITY_SCORE: 21

## 2023-03-29 NOTE — OP NOTE
[facility] General Surgery Operative Note    Pre-operative diagnosis: gallstone pancreatitis   Post-operative diagnosis: same   Procedure: Robotic cholecystectomy   Surgeon: Roby Wellington MD   Assistant(s): Loida Chauhan PA-C  The Physician Assistant was medically necessary for their expertise in prepping, camera management, suctioning, suturing and retraction.   Anesthesia: general   Estimated blood loss:  Specimen: 5 cc  gallbladder and contents               INDICATION FOR OPERATION: This is a 32 year old female who presented to ED with gallstone pancreatitis and choledocholithiasis, she underwent ERCP. She is at the OR today for cholecystectomy.   We discussed robotic cholecystectomy and the patient agreed to proceed after hearing the risks and benefits.    DESCRIPTION OF PROCEDURE:  The patient was taken to the operating room and placed on the table in supine position.  General endotracheal anesthesia was induced and the abdomen was prepped and draped in standard sterile fashion.  A time out was performed.  An 11 blade scalpel was used to make a transverse periumbilical incision.  A carlyle was used to dissect down to the fasica and this was grasped with a kocher and elevated. Next the Veress needle was placed into the abdomen. After passing a meniscus test the abdomen was then insufflated to 12 mmHg.  The Veress needle was then removed and the abdomen was entered with an 8mm trocar.  The abdomen was inspected and there were no injuries obviously noted from Veress or trocar placement.  An 8mm port was placed in the left abdomen.  Two additional 8 mm ports were placed in the right abdomen. The patient was placed in reverse Trendelenburg and right side up.  The gallbladder appeared moderately thickened.  The fundus of the gallbladder was grasped and retracted cephalad. Omental adhesions were taken down with cautery.  The infundibulum was grasped and retracted laterally.  The peritoneum over the medial  and lateral aspects of the triangle of Calot was taken down with hook cautery and blunt dissection. . The cystic duct and artery were freed up from surrounding tissues.  The triangle of Calot was skeletonized revealing the critical view of safety.    As the patient had received ICG preoperatively, Intraoperative fluorescence was also used to visualize the course of the common bile duct and cystic duct, with no other ductal structures seen.  The cystic artery and duct were each clipped proximally and distally and transected with the hook cautery.  The gallbladder was then removed from the liver using the hook electrocautery. The liver bed was visualized with ICG fluorescence and no bile leakage was seen.   The gallbladder was passed into an Endocatch bag. We observed the right upper quadrant carefully for hemostasis.  Hemostasis was assured.  The gallbladder was removed from the umbilical port.  Each of the remaining ports were removed under direct visualization.  The CO2 was evacuated.  There was no bleeding from any of these sites.  The umbilical port was closed with 0 Vicryl in figure-of-eight fashion. All incision sites were anesthetized with local anesthetic.  All of the incisions were closed with interrupted 4-0 Vicryl subcuticular sutures and sterile glue was placed as a dressing.  The patient tolerated the procedure well.  Sponge and instrument counts were correct.      FINDINGS: inflamed gallbladder and cystic triangle    Roby Wellington MD

## 2023-03-29 NOTE — ANESTHESIA PROCEDURE NOTES
Airway       Patient location during procedure: OR       Procedure Start/Stop Times: 3/29/2023 3:09 PM  Staff -        Anesthesiologist:  Yoel Zepeda MD       CRNA: Prashant Frederick APRN CRNA       Performed By: anesthesiologist  Consent for Airway        Urgency: elective  Indications and Patient Condition       Indications for airway management: caleb-procedural       Induction type:intravenous       Mask difficulty assessment: 1 - vent by mask    Final Airway Details       Final airway type: endotracheal airway       Successful airway: ETT - single  Endotracheal Airway Details        ETT size (mm): 7.0       Cuffed: yes       Successful intubation technique: direct laryngoscopy       DL Blade Type: MAC 4       Grade View of Cords: 2       Adjucts: stylet       Position: Right       Measured from: lips       Secured at (cm): 22       Bite block used: Oral Airway    Post intubation assessment        Placement verified by: capnometry, equal breath sounds and chest rise        Number of attempts at approach: 2       Number of other approaches attempted: 0       Secured with: plastic tape       Ease of procedure: easy       Dentition: Intact and Unchanged    Medication(s) Administered   Medication Administration Time: 3/29/2023 3:09 PM

## 2023-03-29 NOTE — PLAN OF CARE
Pt remained vitally stable and safe this shift. Pt tolerating regular diet and denies nausea. Pt not complaining of pain. Passing gas. Voiding and ambulating independently. Pt's surgery scheduled for 14:30 tomorrow 3/29, needs to be NPO 8 hours prior. Pt encouraged to call with questions/concerns, will continue to monitor and treat.

## 2023-03-29 NOTE — ANESTHESIA PREPROCEDURE EVALUATION
Anesthesia Pre-Procedure Evaluation    Patient: Ashley Lorenzo   MRN: [id number] : 1990        Procedure : Procedure(s):  ROBOTIC ASSISTED LAPAROSCOPIC CHOLECYSTECTOMY,, WITHOUT CHOLANGIOGRAM          Past Medical History:   Diagnosis Date     Anxiety     no meds needed     Chickenpox      Mild postpartum depression 2011      Past Surgical History:   Procedure Laterality Date     ENDOSCOPIC RETROGRADE CHOLANGIOPANCREATOGRAM N/A 3/27/2023    Procedure: Endoscopic retrograde cholangiopancreatogram;  Surgeon: Tc Powell MD;  Location:  OR      STATISTIC AMNIOINFUSION  2012          MOUTH SURGERY      wisdom teeth      Allergies   Allergen Reactions     Cats      Dogs      Dust Mites      Penicillins      Pollen Extract/Tree Extract       Social History     Tobacco Use     Smoking status: Never     Smokeless tobacco: Never   Substance Use Topics     Alcohol use: Yes     Comment: rare      Wt Readings from Last 1 Encounters:   23 86.9 kg (191 lb 8 oz)        Anesthesia Evaluation   Pt has had prior anesthetic.     No history of anesthetic complications       ROS/MED HX  ENT/Pulmonary:  - neg pulmonary ROS     Neurologic:  - neg neurologic ROS     Cardiovascular:  - neg cardiovascular ROS     METS/Exercise Tolerance:     Hematologic:     (+) anemia,     Musculoskeletal:  - neg musculoskeletal ROS     GI/Hepatic:     (+) cholecystitis/cholelithiasis, liver disease,     Renal/Genitourinary:       Endo:     (+) Obesity,     Psychiatric/Substance Use:     (+) psychiatric history anxiety     Infectious Disease:  - neg infectious disease ROS     Malignancy:       Other:            Physical Exam    Airway        Mallampati: II   TM distance: > 3 FB   Neck ROM: full   Mouth opening: > 3 cm    Respiratory Devices and Support         Dental       (+) Minor Abnormalities - some fillings, tiny chips      Cardiovascular          Rhythm and rate: regular and normal     Pulmonary           breath  sounds clear to auscultation           OUTSIDE LABS:  CBC:   Lab Results   Component Value Date    WBC 5.0 03/29/2023    WBC 8.3 03/28/2023    HGB 11.5 (L) 03/29/2023    HGB 11.4 (L) 03/28/2023    HCT 35.4 03/29/2023    HCT 34.4 (L) 03/28/2023     03/29/2023     03/28/2023     BMP:   Lab Results   Component Value Date     03/29/2023     03/28/2023    POTASSIUM 3.9 03/29/2023    POTASSIUM 4.0 03/28/2023    CHLORIDE 106 03/29/2023    CHLORIDE 102 03/28/2023    CO2 25 03/29/2023    CO2 22 03/28/2023    BUN 14.8 03/29/2023    BUN 9.3 03/28/2023    CR 0.72 03/29/2023    CR 0.64 03/28/2023    GLC 96 03/29/2023    GLC 96 03/28/2023     COAGS:   Lab Results   Component Value Date    INR 0.90 03/26/2023     POC:   Lab Results   Component Value Date    HCG Negative 03/26/2023     HEPATIC:   Lab Results   Component Value Date    ALBUMIN 3.1 (L) 03/29/2023    PROTTOTAL 5.7 (L) 03/29/2023     (HH) 03/29/2023     (H) 03/29/2023    ALKPHOS 215 (H) 03/29/2023    BILITOTAL 0.7 03/29/2023     OTHER:   Lab Results   Component Value Date    DONNA 8.6 03/29/2023    LIPASE 48 03/28/2023    TSH 1.61 03/20/2014       Anesthesia Plan    ASA Status:  2   NPO Status:  NPO Appropriate    Anesthesia Type: General.     - Airway: ETT   Induction: Intravenous.   Maintenance: Balanced.   Techniques and Equipment:       - Drips/Meds: Dexmed. infusion     Consents    Anesthesia Plan(s) and associated risks, benefits, and realistic alternatives discussed. Questions answered and patient/representative(s) expressed understanding.    - Discussed:     - Discussed with:  Patient      - Extended Intubation/Ventilatory Support Discussed: No.      - Patient is DNR/DNI Status: No    Use of blood products discussed: No .     Postoperative Care    Pain management: Oral pain medications, Multi-modal analgesia, IV analgesics.     - Plan for long acting post-op opioid use   PONV prophylaxis: Ondansetron (or other 5HT-3),  Dexamethasone or Solumedrol     Comments:                Yoel Zepeda MD

## 2023-03-29 NOTE — PROGRESS NOTES
GI Brief Note    Chart checked, patient not seen. Tolerated advancing diet yesterday, currently NPO for lap dalila this afternoon. LFTs trending down, t bili normal.     GI will sign off, please call with questions/concerns.     Monica Urrutia PA-C  [facility]  Cell: [phone] until 12PM  Office: [phone]

## 2023-03-29 NOTE — DISCHARGE INSTRUCTIONS
HOME CARE FOLLOWING LAPAROSCOPIC CHOLECYSTECTOMY  RIKI Merino, MEGHA Thomas C. Pratt, J. Shaheen    INCISIONAL CARE:  Replace the bandage over your incisions DAILY until all drainage stops, or if more comfortable to have in place.  If present, leave the steri-strips (white paper tapes) in place for 14 days after surgery.  If Dermabond (a type of skin glue) is present, leave in place until it wears/flakes off (2-3 weeks).     BATHING:  OK to shower 48 hours after surgery.  Avoid baths for 1 week after surgery.  You may wash your hair at any time.  Gently pat your incision dry after bathing.  Do not apply lotions, creams, or ointments to incisions.    ACTIVITY:  Light Activity -- you may immediately be up and about as tolerated.  Walking is encouraged, increase as tolerated.  Driving/Light Work-- when comfortable and off narcotic pain medications.  Strenuous Work/Activity -- limit lifting to 20 pounds for 2 weeks.  Progressively increase with time.  Active Sports (running, biking, etc.) -- cautiously resume after 2 weeks.    DISCOMFORT:  Local anesthetic placed at surgery should provide relief for 4-8 hours.  Begin taking pain pills before discomfort is severe.  Take the pain medication with some food, when possible, to minimize side effects.  Intermittent use of ice packs may help during the first 1-3 weeks after surgery.  Expect gradual improvement.    Over-the-counter anti-inflammatory medications (i.e. Ibuprofen/Advil/Motrin or Naprosyn/Aleve) may be used per package instructions in addition to or while tapering off the narcotic pain medications to decrease swelling and sensitivity.  DO NOT TAKE these Anti-inflammatory medications if your primary physician has advised against doing so, or if you have acid reflux, ulcer, or bleeding disorder, or take blood-thinner medications.  Call your primary physician or the surgery office if you have medication questions.    After laparoscopic  cholecystectomy, you may have shoulder or upper back discomfort due to the gas used during surgery.  This is temporary and should resolve within 2-3 days.  Frequent short walks may help with this.  You may have decreased energy level for 1-2 weeks after surgery related to your recovery.    DIET:  Start with liquids and gradually increase diet as tolerated.  Drink plenty of fluids.  While taking pain medications, consider use of a stool softener, increase your fiber in your diet, or add a fiber supplement (like Metamucil, Citrucel) to help prevent constipation - a possible side effect of pain medications.  It is not uncommon to experience some bowel changes (loose stools or constipation) after surgery.  Your body has to adapt to you no longer having a gall bladder.  To help minimize this side effect, avoid fatty foods for 1-2 weeks after surgery.  You may then slowly increase the amount of fatty foods in your diet.      NAUSEA:  If nauseated from the anesthetic/pain meds; rest in bed, get up cautiously with assistance, and drink clear liquids (juice, tea, broth).    FOLLOW-UP AFTER SURGERY:  -Our office will contact you approximately 2-3 weeks after surgery to check on your progress and answer any questions you may have.  If you are doing well, you will not need to return for an office appointment.  If any concerns are identified over the phone, we will help you make an appointment to see a provider.    -If you have not received a phone call, have any questions or concerns, or would like to be seen, please call us at [phone].  We are located at: [address], [address]; [place] [address]    -CONTACT US IF THE FOLLOWING DEVELOPS:   1. A fever that is above 101     2. Increased redness, warmth, drainage, bleeding, or swelling.   3. Pain that is not relieved by rest/ice and your prescription.   4.  Increasing pain after 48 hours.   5. Drainage that is thick, cloudy, yellow, green or white.   6. Any other  questions or concerns.   GENERAL ANESTHESIA OR SEDATION ADULT DISCHARGE INSTRUCTIONS   SPECIAL PRECAUTIONS FOR 24 HOURS AFTER SURGERY    IT IS NOT UNUSUAL TO FEEL LIGHT-HEADED OR FAINT, UP TO 24 HOURS AFTER SURGERY OR WHILE TAKING PAIN MEDICATION.  IF YOU HAVE THESE SYMPTOMS; SIT FOR A FEW MINUTES BEFORE STANDING AND HAVE SOMEONE ASSIST YOU WHEN YOU GET UP TO WALK OR USE THE BATHROOM.    YOU SHOULD REST AND RELAX FOR THE NEXT 24 HOURS AND YOU MUST MAKE ARRANGEMENTS TO HAVE SOMEONE STAY WITH YOU FOR AT LEAST 24 HOURS AFTER YOUR DISCHARGE.  AVOID HAZARDOUS AND STRENUOUS ACTIVITIES.  DO NOT MAKE IMPORTANT DECISIONS FOR 24 HOURS.    DO NOT DRIVE ANY VEHICLE OR OPERATE MECHANICAL EQUIPMENT FOR 24 HOURS FOLLOWING THE END OF YOUR SURGERY.  EVEN THOUGH YOU MAY FEEL NORMAL, YOUR REACTIONS MAY BE AFFECTED BY THE MEDICATION YOU HAVE RECEIVED.    DO NOT DRINK ALCOHOLIC BEVERAGES FOR 24 HOURS FOLLOWING YOUR SURGERY.    DRINK CLEAR LIQUIDS (APPLE JUICE, GINGER ALE, 7-UP, BROTH, ETC.).  PROGRESS TO YOUR REGULAR DIET AS YOU FEEL ABLE.    YOU MAY HAVE A DRY MOUTH, A SORE THROAT, MUSCLES ACHES OR TROUBLE SLEEPING.  THESE SHOULD GO AWAY AFTER 24 HOURS.    CALL YOUR DOCTOR FOR ANY OF THE FOLLOWING:  SIGNS OF INFECTION (FEVER, GROWING TENDERNESS AT THE SURGERY SITE, A LARGE AMOUNT OF DRAINAGE OR BLEEDING, SEVERE PAIN, FOUL-SMELLING DRAINAGE, REDNESS OR SWELLING.    IT HAS BEEN OVER 8 TO 10 HOURS SINCE SURGERY AND YOU ARE STILL NOT ABLE TO URINATE (PASS WATER).      You received Toradol, an IV form of Ibuprofen (Motrin) at 5:15pm.  Do not take any Ibuprofen products until 11:15pm.

## 2023-03-29 NOTE — PLAN OF CARE
VSS. NPO since 0500. Denies nausea or pain. Passed BM this morning and is voiding without difficulty. IV infusing LR and antibiotics as ordered. Pt called writer into rm at 1100 to ask about possibility of getting MRI during this hospital stay r/t past brain injury. Paged Dr Madison to notify of pt request and to update on pts noted rising anxiety level. Orders for Buspar or Atarax given PRN, pt to discuss MRI with primary physician after hospital stay. Pt verbalized understanding and is agreeable.   Pt skin cleansed, new gown given and was taken by transport to OR via cart at 12:35p. IV antibiotics infusing.

## 2023-03-29 NOTE — ANESTHESIA CARE TRANSFER NOTE
Patient: Ashley Lorenzo    Procedure: Procedure(s):  ROBOTIC ASSISTED LAPAROSCOPIC CHOLECYSTECTOMY, WITHOUT CHOLANGIOGRAM       Diagnosis: Gallstone pancreatitis [K85.10]  Diagnosis Additional Information: No value filed.    Anesthesia Type:   General     Note:    Oropharynx: spontaneously breathing  Level of Consciousness: awake  Oxygen Supplementation: face mask  Level of Supplemental Oxygen (L/min / FiO2): 6l  Independent Airway: airway patency satisfactory and stable  Dentition: dentition unchanged  Vital Signs Stable: post-procedure vital signs reviewed and stable  Report to RN Given: handoff report given  Patient transferred to: PACU  Comments: Pt to PACU, VSS, report to RN  Handoff Report: Identifed the Patient, Identified the Reponsible Provider, Reviewed the pertinent medical history, Discussed the surgical course, Reviewed Intra-OP anesthesia mangement and issues during anesthesia, Set expectations for post-procedure period and Allowed opportunity for questions and acknowledgement of understanding      Vitals:  Vitals Value Taken Time   BP     Temp     Pulse     Resp     SpO2         Electronically Signed By: TASNEEM Hong CRNA  March 29, 2023  4:08 PM

## 2023-03-29 NOTE — ANESTHESIA POSTPROCEDURE EVALUATION
Patient: Ashley Lorenzo    Procedure: Procedure(s):  ROBOTIC ASSISTED LAPAROSCOPIC CHOLECYSTECTOMY, WITHOUT CHOLANGIOGRAM       Anesthesia Type:  General    Note:  Disposition: Outpatient   Postop Pain Control: Uneventful            Sign Out: Well controlled pain   PONV: No   Neuro/Psych: Uneventful            Sign Out: Acceptable/Baseline neuro status   Airway/Respiratory: Uneventful            Sign Out: Acceptable/Baseline resp. status   CV/Hemodynamics: Uneventful            Sign Out: Acceptable CV status   Other NRE: NONE   DID A NON-ROUTINE EVENT OCCUR? No           Last vitals:  Vitals Value Taken Time   /70 03/29/23 1650   Temp 96.4  F (35.8  C) 03/29/23 1605   Pulse 58 03/29/23 1654   Resp 8 03/29/23 1654   SpO2 97 % 03/29/23 1654   Vitals shown include unvalidated device data.    Electronically Signed By: Yoel Zepeda MD  March 29, 2023  4:55 PM

## 2023-03-29 NOTE — PLAN OF CARE
Vitals & assessments WNL. Pt. c/o headache & treated with Ibuprofen & cold pack. Pt. denies nausea. NPO for surgery today. Rt. PIV with LR at 100cc/hour.  Pt. c/o constipation & given prn Senokot x1. Last BM 3/26. CBC & BMP this am, results pending.  OR at 1430 today.

## 2023-03-29 NOTE — PROVIDER NOTIFICATION
This RN spoke with beth Cruz to discharge from PACU per surgery orders.  RN mentioned pt wanting MRI, MD recommends that patient follows up with her PCP in a week to be evaluated.

## 2023-03-30 ENCOUNTER — PATIENT OUTREACH (OUTPATIENT)
Dept: FAMILY MEDICINE | Facility: CLINIC | Age: 33
End: 2023-03-30
Payer: COMMERCIAL

## 2023-03-30 NOTE — TELEPHONE ENCOUNTER
Spoke with pt for hosp F/U s/p 03-30-23 robotic assisted lap dalila. Reports abd tenderness managed with oxycodone. No BM since surg, will be starting Miralax or stool softener. Decreased appetite, adequate fluid intake. Eating low fat diet. States incision look good, no s/sx infection. No fevers, chills.   Awaiting call from surg to schedule F/U appt.   DEEPAK Baumann RN

## 2023-03-31 LAB
PATH REPORT.COMMENTS IMP SPEC: NORMAL
PATH REPORT.COMMENTS IMP SPEC: NORMAL
PATH REPORT.FINAL DX SPEC: NORMAL
PATH REPORT.GROSS SPEC: NORMAL
PATH REPORT.MICROSCOPIC SPEC OTHER STN: NORMAL
PATH REPORT.RELEVANT HX SPEC: NORMAL
PHOTO IMAGE: NORMAL

## 2023-04-08 NOTE — DISCHARGE SUMMARY
Hospitalist Discharge Summary  [facility]    Ashley Lorenzo MRN# [id number]   YOB: 1990 Age: 32 year old     Date of Admission:  3/26/2023  Date of Discharge:  3/29/2023  6:12 PM  Admitting Physician:  Rayray Che DO  Discharge Physician:  Luther Madison DO  Discharging Service:  Hospitalist     Primary Provider: Lynn Almodovar          Discharge Diagnosis:     Acute Intractable Abdominal Pain, Nausea, and Vomiting  Suspected Gallstone Pancreatitis s/p ERCP on 3/27  Transaminitis  Cholelithiasis  - Appreciate Gastroenterology recommendations  - Appreciate General Surgery recommendations  - s/p ERCP on 3/27 with biliary sphincterectomy and balloon extraction of 2 stones  - Tolerated clear liquid diet evening of 3/28.  NPO now for lap dalila  - Pain control  - Antiemetics  - IVF  - Continue ceftriaxone and metronidazole.  Ok to stop after lap dalila, unless felt necessary by gen surg  - Daily Hepatic Panel     Chronic Medical Problems:  Anxiety             Discharge Disposition:     Discharged to home           Hospital Course:     Ashley Lorenzo is a 32 year old female with a history of anxiety admitted on 3/26/2023 with abdominal pain with nausea and vomiting.  In the emergency department, the patient was found to have a temperature of 98.0  F, blood pressure 123/82, heart rate 67, respiratory rate 17, SPO2 100% on room air.  Initial lab work showed alkaline phosphatase 316, AST//737, total bilirubin 2.2, lipase greater than 3000, glucose 121.  Right upper quadrant ultrasound showed cholelithiasis with common bile duct measuring 3 mm.  The patient was made n.p.o., started on IV fluids, and provided with pain control and antiemetics for the treatment of suspected gallstone pancreatitis.  General surgery and gastroenterology were consulted to see the patient.  MRCP revealed cholelithiasis and gallbladder wall edema possibly representing cholecystitis, 2 tiny local  "signal foci in the proximal and distal common bile duct measuring 4 mm possibly representing choledocholithiasis with common bile duct measuring 4 mm.  On 3/27, the patient was taken for ERCP with biliary sphincterectomy and balloon extraction of 2 stones.   On 3/29, the patient was taken by general surgery for robotic cholecystectomy.  Post procedure the patient was doing well and was discharged home to follow-up as an outpatient.    The patient was seen, examined, and counseled on this day. The hospitalization and plan of care was reviewed with the patient extensively. All questions were addressed and the patient agreed to follow-up as noted above.           Allergies:     Allergies   Allergen Reactions     Cats      Dogs      Dust Mites      Penicillins      Pollen Extract/Tree Extract               Discharge Medications:     Discharge Medication List as of 3/29/2023  5:35 PM      START taking these medications    Details   oxyCODONE (ROXICODONE) 5 MG tablet Take 1-2 tablets (5-10 mg) by mouth every 4 hours as needed for moderate to severe pain, Disp-10 tablet, R-0, E-Prescribe         CONTINUE these medications which have NOT CHANGED    Details   busPIRone (BUSPAR) 10 MG tablet Take 10 mg by mouth 3 times daily as needed for anxiety, Historical      ibuprofen (ADVIL,MOTRIN) 600 MG tablet Take 1 tablet (600 mg) by mouth every 6 hours as needed for moderate pain, Disp-90 tablet, R-1, OTC      UNABLE TO FIND MEDICATION NAME: Rehana back and body (aspirin and caffeine)- 1 tablet daily prn for migraines, Historical         STOP taking these medications       acetaminophen (TYLENOL) 500 MG tablet Comments:   Reason for Stopping:                      Condition on Discharge:     Discharge condition: Fair   Discharge vitals: Blood pressure (!) 133/90, pulse 66, temperature 98.2  F (36.8  C), temperature source Temporal, resp. rate 16, height 1.588 m (5' 2.5\"), weight 86.9 kg (191 lb 8 oz), last menstrual period " 03/26/2023, SpO2 98 %, unknown if currently breastfeeding.   Code status on discharge: Full Code      BASIC PHYSICAL EXAMINATION:  GENERAL: No apparent distress.  CARDIOVASCULAR: Regular rate and rhythm without murmurs.  PULMONARY: Clear to auscultation bilaterally.   GASTROINTESTINAL: Abdomen soft, non-tender.  EXTREMITIES: No edema, pulses intact.  NEUROLOGIC: No focal deficits.            History of Illness:   See detailed admission note for full details.               Procedures excluding imaging which is summarized below:     Please see details in the electronic medical record.           Consultations:     SURGERY GENERAL IP CONSULT  GASTROENTEROLOGY IP CONSULT          Significant Results:     Results for orders placed or performed during the hospital encounter of 03/26/23   US Abdomen Limited    Narrative    EXAM: US ABDOMEN LIMITED  LOCATION: [facility]  DATE/TIME: 3/26/2023 6:11 PM    INDICATION: Right upper quadrant abdominal pain.  COMPARISON: None.  TECHNIQUE: Limited abdominal ultrasound.    FINDINGS:    GALLBLADDER: Gallstones in an otherwise normal gallbladder. No wall thickening, or pericholecystic fluid. Negative sonographic Gagnon's sign.    BILE DUCTS: No biliary dilatation. The common duct measures 3 mm.    LIVER: Normal parenchyma with smooth contour. No focal mass.    RIGHT KIDNEY: No hydronephrosis.    PANCREAS: Head and body portions normal, tail obscured by bowel gas.    No ascites.      Impression    IMPRESSION:  1.  Cholelithiasis in an otherwise normal appearing gallbladder.  2.  Right upper quadrant ultrasound otherwise negative.       MR Abdomen MRCP w/o & w Contrast    Narrative    MR ABDOMEN MRCP WITHOUT AND WITH CONTRAST   3/27/2023 11:45 AM     HISTORY: Elevated LFTs, gallstones, question common bile duct stone.    TECHNIQUE: Multiplanar, multiecho MRI was performed using T1, T2, and  in- and out-of-phase imaging. Pre- and postcontrast T1 images were  acquired  after the administration of 9 mL Gadavist IV. MRCP images and  coronal 3-D thin section MRCP images, were acquired on the scanner  through the biliary tree. 3-D image reformatting was performed on the  acquisition scanner.    COMPARISON: Ultrasound abdomen 3/26/2023.    FINDINGS:  HEPATOBILIARY: MRCP demonstrates several gallstones in the gallbladder  lumen. There is mild gallbladder wall edema. Question of a small  distal common bile duct filling defect measuring approximately 4 mm  series 6 image 64. The common bile duct itself is not dilated  measuring 4 mm. Another small proximal common bile duct filling defect  is 4 mm series 6 image 71. Suggestion of a degree of fatty liver. No  focal worrisome hepatic observation identified.    PANCREAS: Normal.    SPLEEN: Normal.    ADRENAL GLANDS: Normal.    KIDNEYS/BLADDER: Normal.    BOWEL: No acute abnormality identified.    ADDITIONAL FINDINGS: None.    MUSCULOSKELETAL: Normal.      Impression    IMPRESSION:   1.  Cholelithiasis and gallbladder wall edema that may represent  cholecystitis.  2.  There are two tiny low signal foci in the proximal and distal  common bile duct each measuring 4 mm that could be tiny areas of  choledocholithiasis. No biliary ductal dilatation is seen. Common bile  duct is 4 mm in diameter.      PENG CARROLL MD         SYSTEM ID:  [id number]   XR Surgery TYREL L/T 5 Min Fluoro w Stills    Narrative    This exam was marked as non-reportable because it will not be read by a   radiologist or a [place] non-radiologist provider.               Transthoracic Echocardiogram Results:  No results found for this or any previous visit (from the past 4320 hour(s)).             Pending Results:     Unresulted Labs Ordered in the Past 30 Days of this Admission     No orders found from 2/24/2023 to 3/27/2023.                      Discharge Instructions and Follow-Up:     Discharge instructions and follow-up:   Discharge Procedure Orders   Reason for your  hospital stay   Order Comments: Gallstone Pancreatitis, Cholelithiasis     Follow-up and recommended labs and tests    Order Comments: Follow up with primary care provider, Lynn Valderrama, within 7 days for hospital follow- up.  The following labs/tests are recommended: CBC, CMP.     When to call - Contact Surgeon Team   Order Comments: You may experience symptoms that require follow-up before your scheduled appointment. Contact your Surgeon Team if you are concerned about pain control, large amount of bleeding, blood clots, constipation, or if you experience signs of infection (fever, growing tenderness at the surgery site, a large amount of drainage, severe pain, foul-smelling drainage, redness or swelling.     When to call - Reach out to Urgent Care   Order Comments: If you are experiencing uncontrolled Nausea and Vomiting, uncontrolled pain, inability to urinate and uncomfortable, and in need of immediate care, and you are NOT able to reach your Surgeon Team, go to an Urgent Care clinic. Do NOT go to the Emergency Room unless you have shortness of breath, chest pain, or other signs of a medical emergency.     When to call - Reasons to Call 911   Order Comments: Call 911 immediately if you experience sudden-onset chest pain, arm weakness/numbness, slurred speech, or shortness of breath     Symptoms - Fever Management   Order Comments: A low grade fever can be expected after surgery. Your Provider many have prescribed an Opioid pain medication that also contains acetaminophen (TYLENOL) that may help with Fever management.  Do NOT take additional acetaminophen (TYLENOL) in combination with an Opioid/acetaminophen (TYLENOL) product. Read the labels on your Over The Counter (OTC) medications with care.     Symptoms - Reduced Urine Output   Order Comments: If it has been greater than 8 hours since you have urinated despite drinking plenty of water, call your Surgeon Team.     No driving or operating machinery   Order  Comments: Do NOT drive any vehicle or operate mechanical equipment for 24 hours following the end of your surgery.  Even though you may feel normal, your reactions may be affected by Anesthesia medication you received.     No Alcohol   Order Comments: Do NOT drink alcoholic beverages for 24 hours following your surgery and while taking pain medications.     Diet Instructions   Order Comments: Follow your surgeon's orders for any diet restrictions.  If you did not receive any diet restrictions, you may drink clear liquids (apple juice, ginger ale, 7-up, broth, etc.), and progress to your regular diet as you feel able. It is important to stay well-hydrated after surgery and drink plenty of water.     Discharge Instructions - Comfort and Pain Management   Order Comments: Pain after surgery is normal and expected. You will have some amount of pain after surgery. Your pain will improve with time. There are several things you can do to help reduce your pain including: rest, ice, and using pain medications as needed. Use pain interventions and don't wait until pain level is out of control. Contact your Surgeon Team if you have pain that persists or worsens after surgery despite rest, ice, and taking your medication(s) as prescribed. You may have a dry mouth, a sore throat, muscles aches or trouble sleeping, and these symptoms should go away after 24 hours.     Discharge Instructions - Rest   Order Comments: Rest and relax for the next 24 hours. Make arrangements to have someone stay with you overnight, and avoid hazardous and strenuous activities.  Do NOT make any important decisions for the next 24 hours.     Shower/Bathing - No restrictions, may shower after 24 hours   Order Comments: Shower/Bathing - No restrictions, may shower after 24 hours.     Order Specific Question Answer Comments   Is discharge order? Yes      Incision Care   Order Comments: Keep dressing clean and dry, change as instructed by Provider or RN.  Wash your hands with soap and warm water before you touch the site of surgery. If you have skin tapes (steri-strips) on your surgical site, leave them on the surgical site until they fall off on their own (typically 7-10 days). If you have stitches under the skin, they will dissolve, or your doctor will give you instructions on when to return to their office for removal. Unless directed otherwise, remove dressing, if present, the day after surgery and leave open to air. If Dermabond (a type of skin glue) is present, leave in place until it wears/flakes off (2-3 weeks).     Return to normal activity as tolerated   Order Comments: Return to normal activity as tolerated     Order Specific Question Answer Comments   Is discharge order? Yes      May return to work (WITHOUT restrictions)   Order Comments: May return to work in 1 week(s) with NO restrictions.     Order Specific Question Answer Comments   Is discharge order? Yes      Diet   Order Comments: Follow this diet upon discharge: Orders Placed This Encounter      Low Fat Diet     Order Specific Question Answer Comments   Is discharge order? Yes           Total time spent in face to face contact with the patient and coordinating discharge was:  33 Minutes    Luther Madison DO  [facility] Hospitalist  [address] [place] [address]  04/08/2023

## 2023-04-27 ENCOUNTER — TELEPHONE (OUTPATIENT)
Dept: SURGERY | Facility: CLINIC | Age: 33
End: 2023-04-27
Payer: COMMERCIAL

## 2023-04-27 DIAGNOSIS — Z98.890 POSTOPERATIVE STATE: Primary | ICD-10-CM

## 2023-04-27 PROCEDURE — 99024 POSTOP FOLLOW-UP VISIT: CPT | Performed by: PHYSICIAN ASSISTANT

## 2023-04-27 NOTE — TELEPHONE ENCOUNTER
[facility]   Postoperative Follow-up Phone Call  -Call to patient to review recent procedure and recovery    Procedure Date:  March/29  Surgeon:  Dr. Wellington  Procedure:  RoboticLaparoscopic cholecystectomy  Pathology, reviewed with patient:  Cholecystitis, stones    She is now 4 weeks postop.   She is feeling well and denies incision concerns.   Diet:  Regular  Bowel function: Normal  Activity as tolerated    Pt concerns:  none    She may increase activity as tolerated.  She may continue to utilize OTC pain management options as well as use of ice/heat to sites for comfort.  She should expect progressive resolution of the healing ridge along the incisional sites, normalizing bowel function, and improvement of food intolerances over the following 2-3 months.    Pt is recommended to contact the office if worsening pain, onset of fever/redness at any inc site, or new drainage from the area.  Pt also recommended to call office at any time if ongoing questions/concerns during recovery, but otherwise may follow-up on a prn basis.  Ashley is in agreement with this plan.    Loida Chauhan PA-C    Please route or send letter to:  Primary Care Provider (PCP)

## (undated) DEVICE — PACK ERCP CUSTOM RIDGES

## (undated) DEVICE — GLOVE BIOGEL PI MICRO INDICATOR UNDERGLOVE SZ 7.5 48975

## (undated) DEVICE — BALLOON EXTRACTION 3-LUMEN 15X190MM 2.8MM TL B-V233P-A

## (undated) DEVICE — DAVINCI XI OBTURATOR BLADELESS 8MM 470359

## (undated) DEVICE — ESU GROUND PAD ADULT W/CORD E7507

## (undated) DEVICE — DEVICE SUTURE PASSER 14GA WECK EFX EFXSP2

## (undated) DEVICE — BAG CLEAR TRASH 1.3M 39X33" P4040C

## (undated) DEVICE — DECANTER BAG 2002S

## (undated) DEVICE — SPHINCTEROTOME 7FRX25MM TRITOME G22555

## (undated) DEVICE — SOL NACL 0.9% INJ 250ML BAG 2B1322Q

## (undated) DEVICE — SU VICRYL 4-0 PS-2 18" UND J496H

## (undated) DEVICE — LINEN HALF SHEET 5512

## (undated) DEVICE — RAD RX ISOVUE 300 (50ML) 61% IOPAMIDOL CHARGE PER ML

## (undated) DEVICE — COVER FOOTSWITCH URO

## (undated) DEVICE — ESU GROUND PAD UNIVERSAL W/O CORD

## (undated) DEVICE — KIT PROCEDURE W/CLEAN-A-SCOPE LINERS V2 200800

## (undated) DEVICE — GOWN XLG DISP 9545

## (undated) DEVICE — NDL INSUFFLATION 13GA 120MM C2201

## (undated) DEVICE — SU VICRYL 0 UR-6 27" J603H

## (undated) DEVICE — WIRE GUIDE 0.035"X450CM JAGWIRE HP STR TIP M00556580

## (undated) DEVICE — DAVINCI XI SEAL UNIVERSAL 5-8MM 470361

## (undated) DEVICE — LINEN DRAPE 54X72" 5467

## (undated) DEVICE — ADH SKIN CLOSURE PREMIERPRO EXOFIN MICOR HV 0.5ML 3471

## (undated) DEVICE — CLIP ENDO HEMO-LOC GREEN MED/LG 544230

## (undated) DEVICE — Device

## (undated) DEVICE — SOL WATER IRRIG 1000ML BOTTLE 2F7114

## (undated) DEVICE — ENDO POUCH UNIVERSAL RETRIEVAL SYSTEM INZII 5MM CD003

## (undated) DEVICE — LUBRICANT INST ELECTROLUBE EL101

## (undated) DEVICE — BLADE KNIFE SURG 11 371111

## (undated) DEVICE — DAVINCI XI DRAPE ARM 470015

## (undated) DEVICE — LINEN FULL SHEET 5511

## (undated) DEVICE — SUCTION CANISTER MEDIVAC LINER 3000ML W/LID 65651-530

## (undated) RX ORDER — METHADONE HYDROCHLORIDE 10 MG/ML
INJECTION, SOLUTION INTRAMUSCULAR; INTRAVENOUS; SUBCUTANEOUS
Status: DISPENSED
Start: 2023-03-29

## (undated) RX ORDER — PROPOFOL 10 MG/ML
INJECTION, EMULSION INTRAVENOUS
Status: DISPENSED
Start: 2023-03-29

## (undated) RX ORDER — DEXMEDETOMIDINE HYDROCHLORIDE 4 UG/ML
INJECTION, SOLUTION INTRAVENOUS
Status: DISPENSED
Start: 2023-03-29

## (undated) RX ORDER — KETOROLAC TROMETHAMINE 15 MG/ML
INJECTION, SOLUTION INTRAMUSCULAR; INTRAVENOUS
Status: DISPENSED
Start: 2023-03-29

## (undated) RX ORDER — HYDRALAZINE HYDROCHLORIDE 20 MG/ML
INJECTION INTRAMUSCULAR; INTRAVENOUS
Status: DISPENSED
Start: 2023-03-27

## (undated) RX ORDER — CITRIC ACID/SODIUM CITRATE 334-500MG
SOLUTION, ORAL ORAL
Status: DISPENSED
Start: 2023-03-29

## (undated) RX ORDER — LIDOCAINE HYDROCHLORIDE 10 MG/ML
INJECTION, SOLUTION EPIDURAL; INFILTRATION; INTRACAUDAL; PERINEURAL
Status: DISPENSED
Start: 2023-03-29

## (undated) RX ORDER — OXYCODONE HYDROCHLORIDE 5 MG/1
TABLET ORAL
Status: DISPENSED
Start: 2023-03-29

## (undated) RX ORDER — BUPIVACAINE HYDROCHLORIDE AND EPINEPHRINE 5; 5 MG/ML; UG/ML
INJECTION, SOLUTION EPIDURAL; INTRACAUDAL; PERINEURAL
Status: DISPENSED
Start: 2023-03-29

## (undated) RX ORDER — DEXAMETHASONE SODIUM PHOSPHATE 4 MG/ML
INJECTION, SOLUTION INTRA-ARTICULAR; INTRALESIONAL; INTRAMUSCULAR; INTRAVENOUS; SOFT TISSUE
Status: DISPENSED
Start: 2023-03-29

## (undated) RX ORDER — ONDANSETRON 2 MG/ML
INJECTION INTRAMUSCULAR; INTRAVENOUS
Status: DISPENSED
Start: 2023-03-29

## (undated) RX ORDER — KETOROLAC TROMETHAMINE 30 MG/ML
INJECTION, SOLUTION INTRAMUSCULAR; INTRAVENOUS
Status: DISPENSED
Start: 2023-03-29

## (undated) RX ORDER — INDOMETHACIN 50 MG/1
SUPPOSITORY RECTAL
Status: DISPENSED
Start: 2023-03-27

## (undated) RX ORDER — INDOCYANINE GREEN AND WATER 25 MG
KIT INJECTION
Status: DISPENSED
Start: 2023-03-29

## (undated) RX ORDER — GLYCOPYRROLATE 0.2 MG/ML
INJECTION INTRAMUSCULAR; INTRAVENOUS
Status: DISPENSED
Start: 2023-03-29

## (undated) RX ORDER — FENTANYL CITRATE 50 UG/ML
INJECTION, SOLUTION INTRAMUSCULAR; INTRAVENOUS
Status: DISPENSED
Start: 2023-03-27

## (undated) RX ORDER — CEFAZOLIN SODIUM/WATER 2 G/20 ML
SYRINGE (ML) INTRAVENOUS
Status: DISPENSED
Start: 2023-03-29